# Patient Record
Sex: MALE | Race: WHITE | NOT HISPANIC OR LATINO | Employment: FULL TIME | URBAN - NONMETROPOLITAN AREA
[De-identification: names, ages, dates, MRNs, and addresses within clinical notes are randomized per-mention and may not be internally consistent; named-entity substitution may affect disease eponyms.]

---

## 2018-05-27 ENCOUNTER — OFFICE VISIT (OUTPATIENT)
Dept: URGENT CARE | Facility: PHYSICIAN GROUP | Age: 33
End: 2018-05-27
Payer: COMMERCIAL

## 2018-05-27 VITALS
TEMPERATURE: 98.6 F | HEART RATE: 82 BPM | RESPIRATION RATE: 16 BRPM | HEIGHT: 73 IN | OXYGEN SATURATION: 97 % | SYSTOLIC BLOOD PRESSURE: 118 MMHG | DIASTOLIC BLOOD PRESSURE: 72 MMHG | BODY MASS INDEX: 38.02 KG/M2 | WEIGHT: 286.9 LBS

## 2018-05-27 DIAGNOSIS — E66.9 OBESITY (BMI 30-39.9): ICD-10-CM

## 2018-05-27 DIAGNOSIS — L03.115 CELLULITIS OF RIGHT LEG: ICD-10-CM

## 2018-05-27 PROCEDURE — 99204 OFFICE O/P NEW MOD 45 MIN: CPT | Performed by: PHYSICIAN ASSISTANT

## 2018-05-27 RX ORDER — DOXYCYCLINE HYCLATE 100 MG
100 TABLET ORAL 2 TIMES DAILY
Qty: 20 TAB | Refills: 0 | Status: SHIPPED | OUTPATIENT
Start: 2018-05-27 | End: 2018-09-19

## 2018-05-27 ASSESSMENT — PAIN SCALES - GENERAL: PAINLEVEL: 5=MODERATE PAIN

## 2018-05-27 NOTE — LETTER
May 27, 2018         Patient: Zachary Massey   YOB: 1985   Date of Visit: 5/27/2018           To Whom it May Concern:    Zachary Massey was seen in my clinic on 5/27/2018. He may return to work on 5/30/18.    If you have any questions or concerns, please don't hesitate to call.        Sincerely,           Natali Hogan P.A.-C.  Electronically Signed

## 2018-05-27 NOTE — PROGRESS NOTES
Chief Complaint   Patient presents with   • Leg Pain     cellulitis right leg       HISTORY OF PRESENT ILLNESS: Patient is a 32 y.o. male who presents today for the following:    Patient comes in for evaluation of a fever that started 3 days ago. He complains of right lower extremity redness, swelling, and pain. He has a history of cellulitis in the right lower leg and states that this looks the same. He hasn't taken any over-the-counter medication. He drives a truck for a living and feels like he needs a couple of days of the swelling gets worse with sitting.    Patient Active Problem List    Diagnosis Date Noted   • Obesity (BMI 30-39.9) 05/27/2018       Allergies:Robitussin allergy-cough [[id number]] and Robitussin [guiatuss]    Current Outpatient Prescriptions Ordered in [facility]   Medication Sig Dispense Refill   • doxycycline (VIBRAMYCIN) 100 MG Tab Take 1 Tab by mouth 2 times a day. 20 Tab 0     No current Epic-ordered facility-administered medications on file.        Past Medical History:   Diagnosis Date   • Foot injury     fracture when 21 yrs       Social History   Substance Use Topics   • Smoking status: Current Every Day Smoker     Packs/day: 0.50     Types: Cigarettes     Start date: 11/8/2014   • Smokeless tobacco: Never Used      Comment: 1/2 pack a day.   • Alcohol use Yes      Comment: occ       No family status information on file.   No family history on file.    Review of Systems:    Constitutional ROS: No unexpected change in weight, No weakness, No fatigue  Eye ROS: No recent significant change in vision, No eye pain, redness, discharge  Ear ROS: No drainage, No tinnitus or vertigo, No recent change in hearing  Mouth/Throat ROS: No teeth or gum problems, No bleeding gums, No tongue complaints  Neck ROS: No swollen glands, No significant pain in neck  Pulmonary ROS: No chronic cough, sputum, or hemoptysis, No dyspnea on exertion, No wheezing  Cardiovascular ROS: No diaphoresis, No edema,  "No palpitations  Gastrointestinal ROS: No change in bowel habits, No significant change in appetite, No nausea, vomiting, diarrhea, or constipation  Hematologic/Lymphatic ROS: No chills, No night sweats, No weight loss  Skin/Integumentary ROS: No edema, No evidence of rash, No itching      Exam:  Blood pressure 118/72, pulse 82, temperature 37 °C (98.6 °F), resp. rate 16, height 1.854 m (6' 1\"), weight (!) 130.1 kg (286 lb 14.4 oz), SpO2 97 %.  General: Well developed, well nourished. No distress.  Eye: PERRL/EOMI; conjunctivae clear, lids normal.  ENMT: Head is grossly normal.  Pulmonary: Unlabored respiratory effort. Lungs clear to auscultation, no wheezes, no rhonchi.  Cardiovascular: Regular rate and rhythm without murmur. Right pedal pulse is strong.  Extremities: Right lower leg with diffuse edema and erythema on the posterior medial aspect. Tenderness is noted in this area.  Psych: Normal mood. Alert and oriented x3. Judgment and insight is normal.    Assessment/Plan:  Use all medication as directed. Discussed appropriate over-the-counter symptomatic medication, and when to return to clinic. Follow-up for worsening or persistent symptoms.  1. Cellulitis of right leg  doxycycline (VIBRAMYCIN) 100 MG Tab   2. Obesity (BMI 30-39.9)  Patient identified as having weight management issue.  Appropriate orders and counseling given.     "

## 2018-09-05 ENCOUNTER — APPOINTMENT (OUTPATIENT)
Dept: RADIOLOGY | Facility: IMAGING CENTER | Age: 33
End: 2018-09-05
Attending: PHYSICIAN ASSISTANT
Payer: COMMERCIAL

## 2018-09-05 ENCOUNTER — OFFICE VISIT (OUTPATIENT)
Dept: URGENT CARE | Facility: PHYSICIAN GROUP | Age: 33
End: 2018-09-05
Payer: COMMERCIAL

## 2018-09-05 VITALS
SYSTOLIC BLOOD PRESSURE: 122 MMHG | WEIGHT: 288 LBS | HEART RATE: 102 BPM | BODY MASS INDEX: 38 KG/M2 | DIASTOLIC BLOOD PRESSURE: 80 MMHG | TEMPERATURE: 99.4 F | RESPIRATION RATE: 16 BRPM | OXYGEN SATURATION: 97 %

## 2018-09-05 DIAGNOSIS — R07.81 RIB PAIN ON LEFT SIDE: ICD-10-CM

## 2018-09-05 DIAGNOSIS — L03.115 CELLULITIS OF RIGHT LOWER LEG: ICD-10-CM

## 2018-09-05 PROCEDURE — 71101 X-RAY EXAM UNILAT RIBS/CHEST: CPT | Mod: TC,FY,LT | Performed by: PHYSICIAN ASSISTANT

## 2018-09-05 PROCEDURE — 99214 OFFICE O/P EST MOD 30 MIN: CPT | Performed by: PHYSICIAN ASSISTANT

## 2018-09-05 RX ORDER — PREDNISONE 10 MG/1
TABLET ORAL
Qty: 1 QUANTITY SUFFICIENT | Refills: 0 | Status: SHIPPED | OUTPATIENT
Start: 2018-09-05 | End: 2018-09-19

## 2018-09-05 RX ORDER — DOXYCYCLINE HYCLATE 100 MG
100 TABLET ORAL 2 TIMES DAILY
Qty: 20 TAB | Refills: 0 | Status: SHIPPED | OUTPATIENT
Start: 2018-09-05 | End: 2018-09-19

## 2018-09-05 NOTE — LETTER
September 5, 2018         Patient: Zachary Massey   YOB: 1985   Date of Visit: 9/5/2018           To Whom it May Concern:    Zachary Massey was seen in my clinic on 9/5/2018. He may return to work on 9/10/18.    If you have any questions or concerns, please don't hesitate to call.        Sincerely,           Natali Hogan P.A.-C.  Electronically Signed

## 2018-09-06 NOTE — PROGRESS NOTES
Chief Complaint   Patient presents with   • Rib Pain     L Rib Cage x1mos   • Other     Pt stated Cellulitis on R leg x2yrs on and off       HISTORY OF PRESENT ILLNESS: Patient is a 33 y.o. male who presents today for the following:    Patient comes in for evaluation of 2 separate issues:    Right lower leg cellulitis  Patient has a history of significant injury in the right lower leg and has a history of recurring cellulitis.  He states it is starting again with erythema and some mild tenderness.  He denies fever, night sweats, chills, and overt pain at this time.    Left-sided rib pain  Patient reports pain on the anterior/inferior your left-sided ribs for the last several months.  It has been consistent for the last month and the pain itself has gotten worse.  He works as a  and does a lot of heavy lifting.  Any pressure in the area or heavy lifting makes it worse.  He denies any specific injuries.    Patient Active Problem List    Diagnosis Date Noted   • Obesity (BMI 30-39.9) 05/27/2018       Allergies:Robitussin allergy-cough [[id number]-[id number]-fl] and Robitussin [guiatuss]    Current Outpatient Prescriptions Ordered in [facility]   Medication Sig Dispense Refill   • doxycycline (VIBRAMYCIN) 100 MG Tab Take 1 Tab by mouth 2 times a day. 20 Tab 0   • predniSONE (DELTASONE) 10 MG Tab 50 mg x 1 day; 40 mg x 1 day; 30 mg x 1 day; 20 mg x 1 day; 10 mg x 1 day 1 Quantity Sufficient 0   • doxycycline (VIBRAMYCIN) 100 MG Tab Take 1 Tab by mouth 2 times a day. 20 Tab 0     No current Epic-ordered facility-administered medications on file.        Past Medical History:   Diagnosis Date   • Foot injury     fracture when 21 yrs       Social History   Substance Use Topics   • Smoking status: Current Every Day Smoker     Packs/day: 0.50     Types: Cigarettes     Start date: 11/8/2014   • Smokeless tobacco: Never Used      Comment: 1/2 pack a day.   • Alcohol use Yes      Comment: occ       No family status information  on file.   No family history on file.    Review of Systems:   Constitutional ROS: No unexpected change in weight, No weakness, No fatigue  Eye ROS: No recent significant change in vision, No eye pain, redness, discharge  Ear ROS: No drainage, No tinnitus or vertigo, No recent change in hearing  Mouth/Throat ROS: No teeth or gum problems, No bleeding gums, No tongue complaints  Neck ROS: No swollen glands, No significant pain in neck  Pulmonary ROS: No chronic cough, sputum, or hemoptysis, No dyspnea on exertion, No wheezing  Cardiovascular ROS: No diaphoresis, No edema, No palpitations  Gastrointestinal ROS: No change in bowel habits, No significant change in appetite, No nausea, vomiting, diarrhea, or constipation  Hematologic/Lymphatic ROS: No chills, No night sweats, No weight loss      Exam:  Blood pressure 122/80, pulse (!) 102, temperature 37.4 °C (99.4 °F), resp. rate 16, weight (!) 130.6 kg (288 lb), SpO2 97 %.  General: Well developed, well nourished. No distress.  Pulmonary: Unlabored respiratory effort. Lungs clear to auscultation, no wheezes, no rhonchi.  Cardiovascular: Regular rate and rhythm without murmur.  Ribs: Tenderness is noted on the anterior/inferior left side without associated erythema, ecchymosis, soft tissue swelling, or skin changes.  No specific palpable lesions noted.  Extremities: Mild erythema is noted on the inferior medial aspect of the right lower leg.  Blanches with pressure.  No induration noted.  Neurologic: Grossly nonfocal. No facial asymmetry noted.  Skin: Warm, dry, good turgor. No rashes in visible areas.   Psych: Normal mood. Alert and oriented x3. Judgment and insight is normal.    Left-sided ribs, per radiology:  Impression       Normal rib series.     Assessment/Plan:  Patient is frequently on the road and without immediate access to healthcare.  Will provide doxycycline for any worsening signs of cellulitis in the right lower extremity.  Use all medication as directed.   Follow-up for worsening or persistent symptoms.  1. Cellulitis of right lower leg  doxycycline (VIBRAMYCIN) 100 MG Tab   2. Rib pain on left side  predniSONE (DELTASONE) 10 MG Tab    [id number] (WITH 1-VIEW CXR) LEFT

## 2018-09-17 ENCOUNTER — TELEPHONE (OUTPATIENT)
Dept: URGENT CARE | Facility: PHYSICIAN GROUP | Age: 33
End: 2018-09-17

## 2018-09-17 DIAGNOSIS — R10.12 LUQ PAIN: ICD-10-CM

## 2018-09-17 NOTE — TELEPHONE ENCOUNTER
Pain in LUQ persists - requesting additional imaging. Too painful to drive.    Will order CT for further evaluation for patient to schedule at his convenience.  Discussed ER precautions for worsening pain.  Patient verbalizes understanding and agrees with plan of care.

## 2018-09-19 ENCOUNTER — OFFICE VISIT (OUTPATIENT)
Dept: MEDICAL GROUP | Facility: PHYSICIAN GROUP | Age: 33
End: 2018-09-19
Payer: COMMERCIAL

## 2018-09-19 ENCOUNTER — HOSPITAL ENCOUNTER (OUTPATIENT)
Dept: RADIOLOGY | Facility: MEDICAL CENTER | Age: 33
End: 2018-09-19
Attending: PHYSICIAN ASSISTANT
Payer: COMMERCIAL

## 2018-09-19 VITALS
HEART RATE: 104 BPM | OXYGEN SATURATION: 97 % | DIASTOLIC BLOOD PRESSURE: 78 MMHG | SYSTOLIC BLOOD PRESSURE: 126 MMHG | BODY MASS INDEX: 39.02 KG/M2 | WEIGHT: 294.4 LBS | RESPIRATION RATE: 20 BRPM | TEMPERATURE: 98.6 F | HEIGHT: 73 IN

## 2018-09-19 DIAGNOSIS — Z13.29 SCREENING FOR THYROID DISORDER: ICD-10-CM

## 2018-09-19 DIAGNOSIS — R07.89 LEFT-SIDED CHEST WALL PAIN: ICD-10-CM

## 2018-09-19 DIAGNOSIS — R10.12 LUQ PAIN: ICD-10-CM

## 2018-09-19 DIAGNOSIS — Z13.21 ENCOUNTER FOR VITAMIN DEFICIENCY SCREENING: ICD-10-CM

## 2018-09-19 DIAGNOSIS — R10.9 ABDOMINAL PAIN, UNSPECIFIED ABDOMINAL LOCATION: ICD-10-CM

## 2018-09-19 DIAGNOSIS — Z13.6 SCREENING FOR CARDIOVASCULAR CONDITION: ICD-10-CM

## 2018-09-19 PROCEDURE — 99214 OFFICE O/P EST MOD 30 MIN: CPT | Performed by: NURSE PRACTITIONER

## 2018-09-19 PROCEDURE — 700117 HCHG RX CONTRAST REV CODE 255: Performed by: PHYSICIAN ASSISTANT

## 2018-09-19 PROCEDURE — 74160 CT ABDOMEN W/CONTRAST: CPT

## 2018-09-19 RX ORDER — DICLOFENAC SODIUM 75 MG/1
75 TABLET, DELAYED RELEASE ORAL 2 TIMES DAILY PRN
Qty: 30 TAB | Refills: 0 | Status: SHIPPED | OUTPATIENT
Start: 2018-09-19 | End: 2018-10-31

## 2018-09-19 RX ORDER — CYCLOBENZAPRINE HCL 10 MG
10 TABLET ORAL 3 TIMES DAILY PRN
Qty: 30 TAB | Refills: 0 | Status: SHIPPED | OUTPATIENT
Start: 2018-09-19 | End: 2018-10-31

## 2018-09-19 RX ADMIN — IOHEXOL 50 ML: 240 INJECTION, SOLUTION INTRATHECAL; INTRAVASCULAR; INTRAVENOUS; ORAL at 11:51

## 2018-09-19 RX ADMIN — IOHEXOL 100 ML: 350 INJECTION, SOLUTION INTRAVENOUS at 11:51

## 2018-09-19 ASSESSMENT — PATIENT HEALTH QUESTIONNAIRE - PHQ9: CLINICAL INTERPRETATION OF PHQ2 SCORE: 0

## 2018-09-19 ASSESSMENT — PAIN SCALES - GENERAL: PAINLEVEL: NO PAIN

## 2018-09-19 NOTE — LETTER
September 19, 2018    To Whom It May Concern:         This is confirmation that Zachary Massey attended his scheduled appointment with GEORGIA Canales on 9/19/18.    Please excuse Zachary Massey from work for this week 9/17 to 9/21/18.  He is in the process of filing for extended leave of absence and will get paperwork to me this week.         If you have any questions please do not hesitate to call me at the phone number listed below.    Sincerely,          Concepcion Banuelos A.P.R.N.  [phone]

## 2018-09-20 ENCOUNTER — OFFICE VISIT (OUTPATIENT)
Dept: MEDICAL GROUP | Facility: PHYSICIAN GROUP | Age: 33
End: 2018-09-20
Payer: COMMERCIAL

## 2018-09-20 VITALS
OXYGEN SATURATION: 97 % | WEIGHT: 294.4 LBS | HEIGHT: 73 IN | HEART RATE: 99 BPM | SYSTOLIC BLOOD PRESSURE: 134 MMHG | DIASTOLIC BLOOD PRESSURE: 84 MMHG | RESPIRATION RATE: 20 BRPM | BODY MASS INDEX: 39.02 KG/M2 | TEMPERATURE: 98.3 F

## 2018-09-20 DIAGNOSIS — R07.89 LEFT-SIDED CHEST WALL PAIN: ICD-10-CM

## 2018-09-20 PROCEDURE — 99213 OFFICE O/P EST LOW 20 MIN: CPT | Performed by: NURSE PRACTITIONER

## 2018-09-20 ASSESSMENT — PAIN SCALES - GENERAL: PAINLEVEL: 2=MINIMAL-SLIGHT

## 2018-09-20 NOTE — PROGRESS NOTES
CC: Left-sided chest wall pain and to establish care    HISTORY OF THE PRESENT ILLNESS: Patient is a 33 y.o. male. This pleasant patient is here today, accompanied by wife and 2 children, to establish care and for left-sided chest wall pain.  Patient drives a truck for a living.  He does not exercise daily.  He was wanting to get back into bike riding, but had to delay that secondary to chest wall pain.      Left-sided chest wall pain  Patient reports left sided low anterior chest wall pain.  He reports it started about a month ago, getting progressively worse.  Begins to hurt after sitting up for a little over an hour, which affects his ability to work as a .  Describes quality of pain as burning and constant, with a numb quality.  Reports pain gets so severe that he is unable to take a deep breath.  Pain is relieved with reclining backwards or lying flat.  Patient was seen in urgent care 2 weeks ago, chest x-ray and abdominal CT ordered, both results normal.  Patient was prescribed cyclobenzaprine and diclofenac today by urgent care provider.  Patient has not started this medication yet.  Patient will start medication tonight.  Will refer urgently to physiatry.  Consider gabapentin.  Patient has missed work this week.  He was instructed to have extended leave paperwork filled out.  Patient did not know he was supposed to bring paperwork in with him.  He will return tomorrow with [facility] paperwork and we will fill it out together at appointment.  A letter was provided to patient today to excuse him from work this week.      Allergies: Robitussin allergy-cough [pobsdyqvr-yknqmpkv-zd] and Robitussin [guiatuss]    Current Outpatient Prescriptions Ordered in [facility]   Medication Sig Dispense Refill   • diclofenac EC (VOLTAREN) 75 MG Tablet Delayed Response Take 1 Tab by mouth 2 times a day as needed (pain). 30 Tab 0   • cyclobenzaprine (FLEXERIL) 10 MG Tab Take 1 Tab by mouth 3 times a day as needed for Mild  "Pain or Moderate Pain. Do not take while driving or working. 30 Tab 0     No current Epic-ordered facility-administered medications on file.        Past Medical History:   Diagnosis Date   • Foot injury     fracture when 21 yrs       Past Surgical History:   Procedure Laterality Date   • OTHER  2003    donor for bone marrow transplant   • PB REMOVAL OF NAIL BED  right great toe.        Social History   Substance Use Topics   • Smoking status: Former Smoker     Packs/day: 0.50     Types: Cigarettes     Quit date: 8/1/2018   • Smokeless tobacco: Never Used      Comment: 1/2 pack a day.   • Alcohol use Yes      Comment: occ       Family History   Problem Relation Age of Onset   • Other Brother         leukemia       ROS:     - Constitutional: Negative for fever, chills, unexpected weight change, and fatigue/generalized weakness.     - HEENT: Negative for headaches, vision changes, hearing changes, ear pain, rhinorrhea, sinus congestion, and sore throat.      - Respiratory: Negative for cough, dyspnea, and wheezing.      - Cardiovascular: Negative for chest pain, palpitations,  and bilateral lower extremity edema.     - Gastrointestinal: Negative for nausea, vomiting, abdominal pain, melena, diarrhea, constipation.     - Genitourinary: Negative for dysuria, polyuria, hematuria, pyuria, urinary urgency, and urinary incontinence.    - Musculoskeletal: As in HPI.     - Skin: Negative for rash.  Positive for frequent cellulitis in right lower leg.    - Neurological: Negative for dizziness, tremors, focal sensory deficit, focal weakness and headaches.     - Endo/Heme/Allergies: Does not bruise/bleed easily.     - Psychiatric/Behavioral: Negative for depression.             Exam: Blood pressure 126/78, pulse (!) 104, temperature 37 °C (98.6 °F), temperature source Temporal, resp. rate 20, height 1.854 m (6' 1\"), weight (!) 133.5 kg (294 lb 6.4 oz), SpO2 97 %. Body mass index is 38.84 kg/m².    General: Pleasant, alert, obese " habitus, well nourished, well developed male in NAD  HEENT: Normocephalic. Eyes conjunctiva clear lids without ptosis, pupils equal and reactive to light, ears normal shape and contour, canals are clear bilaterally, tympanic membranes are pearly gray with good light reflex, nasal mucosa without erythema and drainage, oropharynx is without erythema, edema or exudates.   Neck: Supple without bruit. Thyroid is not enlarged.  Pulmonary: Clear to ausculation.  Normal effort. No rales, ronchi, or wheezing.  Cardiovascular: Normal rate and rhythm without murmur. Carotid and radial pulses are intact and equal bilaterally.  No lower extremity edema.  Chest: No erythema or edema.  Extremely tender to palpation at left low anterior thorax.  Abdomen: Soft, nondistended.  Moderately tender to palpation of left upper quadrant and epigastric area.    Normal bowel sounds. Liver and spleen are not palpable  Neurologic: Grossly nonfocal  Lymph: No cervical or supraclavicular lymph nodes are palpable  Skin: Warm and dry.  No obvious lesions.  Musculoskeletal: Normal gait.   Psych: Normal mood and affect. Alert and oriented. Judgment and insight is normal.    Please note that this dictation was created using voice recognition software. I have made every reasonable attempt to correct obvious errors, but I expect that there are errors of grammar and possibly content that I did not discover before finalizing the note.      Assessment/Plan  1. Screening for cardiovascular condition    - COMP METABOLIC PANEL; Future  - LIPID PROFILE; Future    2. Screening for thyroid disorder    - TSH; Future  - FREE THYROXINE; Future    3. Encounter for vitamin deficiency screening    - VITAMIN D,25 HYDROXY; Future    4. Left-sided chest wall pain  Patient will start taking medication prescribed to him today from urgent care provider, cyclobenzaprine and Voltaren.  Negative chest x-ray and abdominal CT.  May be nerve pain.  Will refer to physiatry for  further evaluation and management.  - REFERRAL TO PHYSIATRY (PMR)    Patient will return to clinic tomorrow to have FMLA paperwork filled out.  Patient will have lab work done in the next few weeks and return to review results.  Patient will follow up in clinic sooner if needed.

## 2018-09-20 NOTE — ASSESSMENT & PLAN NOTE
Patient reports left sided low anterior chest wall pain.  He reports it started about a month ago, getting progressively worse.  Begins to hurt after sitting up for a little over an hour, which affects his ability to work as a .  Describes quality of pain as burning and constant, with a numb quality.  Reports pain gets so severe that he is unable to take a deep breath.  Pain is relieved with reclining backwards or lying flat.  Patient was seen in urgent care 2 weeks ago, chest x-ray and abdominal CT ordered, both results normal.  Patient was prescribed cyclobenzaprine and diclofenac today by urgent care provider.  Patient has not started this medication yet.  Patient will start medication tonight.  Will refer urgently to physiatry.  Consider gabapentin.  Patient has missed work this week.  He was instructed to have extended leave paperwork filled out.  Patient did not know he was supposed to bring paperwork in with him.  He will return tomorrow with [facility] paperwork and we will fill it out together at appointment.  A letter was provided to patient today to excuse him from work this week.

## 2018-09-21 NOTE — ASSESSMENT & PLAN NOTE
Patient presents today for FMLA paperwork completion.  Please see scanned paperwork.  Patient was fully assessed at yesterday's appointment.

## 2018-09-21 NOTE — PROGRESS NOTES
"  Chief complaint: Left side chest wall pain    Left-sided chest wall pain  Patient presents today for [facility] paperwork completion.  Please see scanned paperwork.  Patient was fully assessed at yesterday's appointment.        Blood pressure 134/84, pulse 99, temperature 36.8 °C (98.3 °F), temperature source Temporal, resp. rate 20, height 1.854 m (6' 1\"), weight (!) 133.5 kg (294 lb 6.4 oz), SpO2 97 %.      Patient was seen for 15 minutes face to face of which > 50% of appointment time was spent reviewing work limits and further workup of etiology and treatment of left chest wall pain, and on counseling and coordination of care regarding the above.  "

## 2018-09-26 ENCOUNTER — TELEPHONE (OUTPATIENT)
Dept: MEDICAL GROUP | Facility: PHYSICIAN GROUP | Age: 33
End: 2018-09-26

## 2018-09-26 DIAGNOSIS — R07.89 CHEST WALL PAIN: ICD-10-CM

## 2018-09-26 NOTE — TELEPHONE ENCOUNTER
Please let patient know that the specialist I referred him to does not see patients with his diagnosis.   Please  inquire if his chest wall pain is improving.    Thanks

## 2018-09-27 RX ORDER — LIDOCAINE 50 MG/G
1 PATCH TOPICAL EVERY 24 HOURS
Qty: 30 PATCH | Refills: 1 | Status: SHIPPED | OUTPATIENT
Start: 2018-09-27 | End: 2018-10-31

## 2018-09-27 NOTE — TELEPHONE ENCOUNTER
Please let patient know that I have sent a prescription to pharmacy for lidocaine patch.  This is a pain relieving patch.  He applies it for 12 hours during the day, then removes for 12 hours before applying new patch.   I would like to see him in clinic next week for reevaluation.  Will you schedule him to see me next week?

## 2018-09-27 NOTE — TELEPHONE ENCOUNTER
Called [phone] ([place])  [facility] for patient to call [phone] to go over provider notes.

## 2018-09-29 NOTE — TELEPHONE ENCOUNTER
Called [phone] ([place])  [facility] for patient to call [phone] to go over provider notes.

## 2018-10-04 ENCOUNTER — HOSPITAL ENCOUNTER (OUTPATIENT)
Dept: LAB | Facility: MEDICAL CENTER | Age: 33
End: 2018-10-04
Attending: NURSE PRACTITIONER
Payer: COMMERCIAL

## 2018-10-04 DIAGNOSIS — Z13.21 ENCOUNTER FOR VITAMIN DEFICIENCY SCREENING: ICD-10-CM

## 2018-10-04 DIAGNOSIS — Z13.6 SCREENING FOR CARDIOVASCULAR CONDITION: ICD-10-CM

## 2018-10-04 DIAGNOSIS — Z13.29 SCREENING FOR THYROID DISORDER: ICD-10-CM

## 2018-10-04 LAB
ALBUMIN SERPL BCP-MCNC: 4 G/DL (ref 3.2–4.9)
ALBUMIN/GLOB SERPL: 1.2 G/DL
ALP SERPL-CCNC: 123 U/L (ref 30–99)
ALT SERPL-CCNC: 77 U/L (ref 2–50)
ANION GAP SERPL CALC-SCNC: 8 MMOL/L (ref 0–11.9)
AST SERPL-CCNC: 33 U/L (ref 12–45)
BILIRUB SERPL-MCNC: 0.7 MG/DL (ref 0.1–1.5)
BUN SERPL-MCNC: 16 MG/DL (ref 8–22)
CALCIUM SERPL-MCNC: 9.3 MG/DL (ref 8.5–10.5)
CHLORIDE SERPL-SCNC: 105 MMOL/L (ref 96–112)
CHOLEST SERPL-MCNC: 150 MG/DL (ref 100–199)
CO2 SERPL-SCNC: 25 MMOL/L (ref 20–33)
CREAT SERPL-MCNC: 0.98 MG/DL (ref 0.5–1.4)
FASTING STATUS PATIENT QL REPORTED: NORMAL
GLOBULIN SER CALC-MCNC: 3.3 G/DL (ref 1.9–3.5)
GLUCOSE SERPL-MCNC: 126 MG/DL (ref 65–99)
HDLC SERPL-MCNC: 37 MG/DL
LDLC SERPL CALC-MCNC: 96 MG/DL
POTASSIUM SERPL-SCNC: 4.1 MMOL/L (ref 3.6–5.5)
PROT SERPL-MCNC: 7.3 G/DL (ref 6–8.2)
SODIUM SERPL-SCNC: 138 MMOL/L (ref 135–145)
TRIGL SERPL-MCNC: 84 MG/DL (ref 0–149)

## 2018-10-04 PROCEDURE — 82306 VITAMIN D 25 HYDROXY: CPT

## 2018-10-04 PROCEDURE — 36415 COLL VENOUS BLD VENIPUNCTURE: CPT

## 2018-10-04 PROCEDURE — 80053 COMPREHEN METABOLIC PANEL: CPT

## 2018-10-04 PROCEDURE — 84439 ASSAY OF FREE THYROXINE: CPT

## 2018-10-04 PROCEDURE — 80061 LIPID PANEL: CPT

## 2018-10-04 PROCEDURE — 84443 ASSAY THYROID STIM HORMONE: CPT

## 2018-10-05 LAB
25(OH)D3 SERPL-MCNC: 17 NG/ML (ref 30–100)
T4 FREE SERPL-MCNC: 0.8 NG/DL (ref 0.53–1.43)
TSH SERPL DL<=0.005 MIU/L-ACNC: 2.41 UIU/ML (ref 0.38–5.33)

## 2018-10-16 ENCOUNTER — TELEPHONE (OUTPATIENT)
Dept: MEDICAL GROUP | Facility: PHYSICIAN GROUP | Age: 33
End: 2018-10-16

## 2018-10-16 NOTE — TELEPHONE ENCOUNTER
Phone Number Called: [phone] (home)     Message: Patient left a message with the front staff stating that he had questions regarding a referral. Called [phone] patients voice mail is not set up at this time-unable to leave a message.    Left Message for patient to call back: no

## 2018-10-18 ENCOUNTER — TELEPHONE (OUTPATIENT)
Dept: MEDICAL GROUP | Facility: PHYSICIAN GROUP | Age: 33
End: 2018-10-18

## 2018-10-18 NOTE — TELEPHONE ENCOUNTER
Patients wife called she wants a referral to see Cardiology, her  is having chest pain an shortness of breath unable to work for 2 weeks.  I advised her to go to the ER if he is having any chest pain.  She said she will call an make him an appointment to see a cardiologist.  I again advised her to go to the ER for them to do a cardiac work up on him.  she said she will talk to him an try to make an appointment.

## 2018-10-18 NOTE — TELEPHONE ENCOUNTER
----- Message from Monica Marsh sent at 10/15/2018  9:23 AM PDT -----  Contact: [phone]  Pt came in for referral and there is not anything in chart for a new referral please give pt a call back asap thank you

## 2018-10-22 ENCOUNTER — TELEPHONE (OUTPATIENT)
Dept: MEDICAL GROUP | Facility: PHYSICIAN GROUP | Age: 33
End: 2018-10-22

## 2018-10-22 DIAGNOSIS — R07.81 RIB PAIN ON LEFT SIDE: ICD-10-CM

## 2018-10-22 DIAGNOSIS — M94.0 SLIPPING RIB SYNDROME: ICD-10-CM

## 2018-10-22 NOTE — TELEPHONE ENCOUNTER
"Telephone call with patient.  He reports that his left low chest wall/left upper quadrant pain is not any better.  He has not tried lidocaine patch that was prescribed two weeks ago. Patient will  prescription today.  Patient was referred to physiatry for chest wall pain, but they would not evaluate patient for diagnosis of chest wall pain.  Pain may be lower rib syndrome/slipping rib syndrome.  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  "

## 2018-10-29 ENCOUNTER — TELEPHONE (OUTPATIENT)
Dept: MEDICAL GROUP | Facility: PHYSICIAN GROUP | Age: 33
End: 2018-10-29

## 2018-10-29 NOTE — TELEPHONE ENCOUNTER
Please call patient and let him know that he is to call to make an appointment with physiatrist/pain specialist. Thanks    [facility]   Dr. Sahil Malave   [address] [facility] [id number]   [place] [address]   Phone: [phone]

## 2018-10-29 NOTE — TELEPHONE ENCOUNTER
Phone Number Called: [phone] (home)       Message: prompt states: VM not set up at this time-unable to leave a voice message.    Left Message for patient to call back: no

## 2018-10-31 ENCOUNTER — OFFICE VISIT (OUTPATIENT)
Dept: MEDICAL GROUP | Facility: PHYSICIAN GROUP | Age: 33
End: 2018-10-31
Payer: COMMERCIAL

## 2018-10-31 VITALS
OXYGEN SATURATION: 96 % | HEIGHT: 73 IN | DIASTOLIC BLOOD PRESSURE: 80 MMHG | BODY MASS INDEX: 39.36 KG/M2 | TEMPERATURE: 97.5 F | SYSTOLIC BLOOD PRESSURE: 128 MMHG | WEIGHT: 297 LBS | RESPIRATION RATE: 16 BRPM | HEART RATE: 82 BPM

## 2018-10-31 DIAGNOSIS — R73.9 BLOOD GLUCOSE ELEVATED: ICD-10-CM

## 2018-10-31 DIAGNOSIS — R07.89 LEFT-SIDED CHEST WALL PAIN: ICD-10-CM

## 2018-10-31 DIAGNOSIS — E55.9 VITAMIN D DEFICIENCY: ICD-10-CM

## 2018-10-31 PROCEDURE — 99214 OFFICE O/P EST MOD 30 MIN: CPT | Performed by: NURSE PRACTITIONER

## 2018-10-31 RX ORDER — ERGOCALCIFEROL 1.25 MG/1
50000 CAPSULE ORAL
Qty: 12 CAP | Refills: 0 | Status: SHIPPED | OUTPATIENT
Start: 2018-10-31 | End: 2022-02-15

## 2018-10-31 RX ORDER — GABAPENTIN 100 MG/1
CAPSULE ORAL
Qty: 90 CAP | Refills: 1 | Status: SHIPPED | OUTPATIENT
Start: 2018-10-31 | End: 2019-01-09

## 2018-10-31 NOTE — ASSESSMENT & PLAN NOTE
New onset.  Fasting blood glucose is elevated at 126.  Patient denies vision changes, numbness, tingling, frequent urination.  Discussed that he may have diabetes.  Will get second fasting glucose in addition to A1c.  Discussed diabetic low carbohydrate diet.

## 2018-10-31 NOTE — ASSESSMENT & PLAN NOTE
Patient continues with left side chest wall pain, worsening.  Was only occurring when would sit too long.  Now occurring with movement and ambulation.  Quality of pain is burning.  Sometimes shoots from left low anterior rib cage to left shoulder.  Patient reports at times pain causes nausea and hard to catch breath as deep breathing aggravates it.  Will have days without pain.  Has not tried lidocaine patch, but did trial lidocaine gel.  Reports lidocaine did not help.  He has also tried diclofenac and cyclobenzaprine without relief.  I did refer patient to Physiatry for possible slipping rib syndrome.  Patient heard back about scheduling received a message to schedule, but he has not done so yet.  I have given him the phone number to call, and he will set up appointment today.  In the meantime, I will prescribe gabapentin.  Instructions and side effects reviewed with patient.  Patient understands to slowly titrate medication and to start at bedtime.  He is aware that this may make him sleepy.  Patient is worried that this may be cardiac related.  I am fairly certain that this is muscular or nerve related.  Patient is currently having chest wall pain.  Will get EKG today.  Patient is also concerned as he has been missing work because of pain.  He is asking for a note to return to work on light duty.  I have given him a letter today to return to work for light duty on 11/5/18.

## 2018-10-31 NOTE — LETTER
October 31, 2018      Regarding:  Zachary Massey          To Whom it May Concern:    Zachary Massey is able to return to work on 11/5/18 on light duty.  We are still in the process of working up the etiology and treatment for his condition.    If you have any questions or concerns, please don't hesitate to call.        Sincerely,        SARAH Canales.    Electronically Signed

## 2018-10-31 NOTE — PROGRESS NOTES
CC:  Chest wall pain and lab review    HISTORY OF THE PRESENT ILLNESS: Patient is a 33 y.o. male. This pleasant patient is here today, accompanied by wife, for the following health problems and to review lab results.    Health Maintenance: Patient declined flu vaccine, though we reviewed benefits of flu vaccine.  Encouraged him to wash his hands frequently and stay out of environments with people who are ill.        Vitamin D deficiency  New finding.  Serum Vitamin D is 17.  Explained that Vitamin D is important in the absorption of calcium, which is instrumental in bone strength. Will prescribe Vitamin D 50,000 units once a week for 12 weeks.  Once you are done with weekly vitamin D, patient will start over the counter supplemental Vitamin D3 4000 units daily.        Blood glucose elevated  New onset.  Fasting blood glucose is elevated at 126.  Patient denies vision changes, numbness, tingling, frequent urination.  Discussed that he may have diabetes.  Will get second fasting glucose in addition to A1c.  Discussed diabetic low carbohydrate diet.     Left-sided chest wall pain  Patient continues with left side chest wall pain, worsening.  Was only occurring when would sit too long.  Now occurring with movement and ambulation.  Quality of pain is burning.  Sometimes shoots from left low anterior rib cage to left shoulder.  Patient reports at times pain causes nausea and hard to catch breath as deep breathing aggravates it.  Will have days without pain.  Has not tried lidocaine patch, but did trial lidocaine gel.  Reports lidocaine did not help.  He has also tried diclofenac and cyclobenzaprine without relief.  I did refer patient to Physiatry for possible slipping rib syndrome.  Patient heard back about scheduling received a message to schedule, but he has not done so yet.  I have given him the phone number to call, and he will set up appointment today.  In the meantime, I will prescribe gabapentin.  Instructions and  side effects reviewed with patient.  Patient understands to slowly titrate medication and to start at bedtime.  He is aware that this may make him sleepy.  Patient is worried that this may be cardiac related.  I am fairly certain that this is muscular or nerve related.  Patient is currently having chest wall pain.  Will get EKG today.  Patient is also concerned as he has been missing work because of pain.  He is asking for a note to return to work on light duty.  I have given him a letter today to return to work for light duty on 11/5/18.      Allergies: Robitussin allergy-cough [[id number]] and Robitussin [guiatuss]    Current Outpatient Prescriptions Ordered in [facility]   Medication Sig Dispense Refill   • gabapentin (NEURONTIN) 100 MG Cap Take 1 tab at bedtime for one week, then take 1 tab twice a day for a week, then 1 tab 3 times a day thereafter 90 Cap 1   • vitamin D, Ergocalciferol, (DRISDOL) 69051 units Cap capsule Take 1 Cap by mouth every 7 days. 12 Cap 0     No current Epic-ordered facility-administered medications on file.        Past Medical History:   Diagnosis Date   • Foot injury     fracture when 21 yrs       Past Surgical History:   Procedure Laterality Date   • OTHER  2003    donor for bone marrow transplant   • PB REMOVAL OF NAIL BED  right great toe.        Social History   Substance Use Topics   • Smoking status: Former Smoker     Packs/day: 0.50     Types: Cigarettes     Quit date: 8/1/2018   • Smokeless tobacco: Never Used      Comment: 1/2 pack a day.   • Alcohol use Yes      Comment: occ       Family History   Problem Relation Age of Onset   • Diabetes Mother    • Heart Attack Father    • Hypertension Father    • Other Brother         leukemia   • Diabetes Paternal Aunt        ROS:     - Constitutional: Negative for fever, chills, unexpected weight change.     - HEENT: Negative for headaches, vision changes, hearing changes, ear pain, rhinorrhea, sinus congestion, and sore throat.   "    - Respiratory: Negative for cough, dyspnea, and wheezing.      - Cardiovascular: As in HPI.     - Gastrointestinal: Negative for heartburn, vomiting, abdominal pain, melena, diarrhea, constipation.     - Genitourinary: Negative for dysuria.    - Musculoskeletal: As in HPI, otherwise negative for joint pain.     - Skin: Negative for rash, itching.     - Neurological: Negative for dizziness, tingling, tremors.        Exam: Blood pressure 128/80, pulse 82, temperature 36.4 °C (97.5 °F), temperature source Temporal, resp. rate 16, height 1.854 m (6' 1\"), weight (!) 134.7 kg (297 lb), SpO2 96 %. Body mass index is 39.18 kg/m².    General: Alert, pleasant, obese habitus, well nourished, well developed male in NAD  Pulmonary: Clear to ausculation.  Normal effort. No rales, ronchi, or wheezing.  Cardiovascular: Normal rate and rhythm without murmur. Carotid and radial pulses are intact and equal bilaterally.  No lower extremity edema.  Abdomen: Soft, nontender, nondistended. Normal bowel sounds.   Neurologic: Grossly nonfocal  Thorax:  Pain reproducible with palpation to left anterior low ribcage.  Skin: Warm and dry.  No obvious lesions.  Musculoskeletal: Normal gait.   Psych: Normal mood and affect. Alert and oriented. Judgment and insight is normal.    EKG:  Reviewed with Dr. Knight.  Shows NSR, questionable RBBB.      Please note that this dictation was created using voice recognition software. I have made every reasonable attempt to correct obvious errors, but I expect that there are errors of grammar and possibly content that I did not discover before finalizing the note.      Assessment/Plan  1. Blood glucose elevated    - COMP METABOLIC PANEL; Future  - HEMOGLOBIN A1C; Future    2. Vitamin D deficiency    - vitamin D, Ergocalciferol, (DRISDOL) 64529 units Cap capsule; Take 1 Cap by mouth every 7 days.  Dispense: 12 Cap; Refill: 0    3. Left-sided chest wall pain  - gabapentin (NEURONTIN) 100 MG Cap; Take 1 tab " at bedtime for one week, then take 1 tab twice a day for a week, then 1 tab 3 times a day thereafter  Dispense: 90 Cap; Refill: 1  - EKG - Clinic Performed    EKG show possible RBBB vs LBBB  Will message cardiology to review EKG.  I will notify patient of recommendations.  Patient and wife verbalized understanding.    Patient will return to clinic in 4 weeks to review lab work and for elevated blood glucose.

## 2018-10-31 NOTE — ASSESSMENT & PLAN NOTE
New finding.  Serum Vitamin D is 17.  Explained that Vitamin D is important in the absorption of calcium, which is instrumental in bone strength. Will prescribe Vitamin D 50,000 units once a week for 12 weeks.  Once you are done with weekly vitamin D, patient will start over the counter supplemental Vitamin D3 4000 units daily.

## 2018-10-31 NOTE — PATIENT INSTRUCTIONS
Call for appointment:  [facility]  Dr. Sahil Malave  [address]  [place] [address]  Phone: [phone]     Take prescription vitamin D once a week.  When done with weekly vitamin D, take over the counter vitamin D3 4000 units daily.    Get fasting lab work done in 1 month before appointment with me.

## 2018-11-01 ENCOUNTER — TELEPHONE (OUTPATIENT)
Dept: MEDICAL GROUP | Facility: PHYSICIAN GROUP | Age: 33
End: 2018-11-01

## 2018-11-01 DIAGNOSIS — R94.31 ABNORMAL EKG: ICD-10-CM

## 2018-11-01 NOTE — TELEPHONE ENCOUNTER
EKG reviewed by cardiology.  Shows SR w/ RSR' of V1 & V2, PAC.  Will refer to cardiology for evaluation.  Patient notified by me via phone call.  Also reminded patient to call and make appointment with Dr. Malave or Dr. Monroy for evaluation of chest wall pain.  He was supposed to set up appointment yesterday, but failed to do so. Yesterday at appointment, he was distressed about not being able to work due to the pain. I explained that it is in his hands to get appointments set up. Patient verbalized understanding and said wife will set up appointment.

## 2018-11-08 ENCOUNTER — OFFICE VISIT (OUTPATIENT)
Dept: PHYSICAL MEDICINE AND REHAB | Facility: MEDICAL CENTER | Age: 33
End: 2018-11-08
Payer: COMMERCIAL

## 2018-11-08 VITALS
WEIGHT: 298.06 LBS | TEMPERATURE: 97.3 F | OXYGEN SATURATION: 96 % | HEIGHT: 73 IN | SYSTOLIC BLOOD PRESSURE: 122 MMHG | DIASTOLIC BLOOD PRESSURE: 82 MMHG | BODY MASS INDEX: 39.5 KG/M2 | HEART RATE: 91 BPM

## 2018-11-08 DIAGNOSIS — R07.89 CHEST WALL PAIN: ICD-10-CM

## 2018-11-08 DIAGNOSIS — M79.604 RIGHT LEG PAIN: ICD-10-CM

## 2018-11-08 DIAGNOSIS — M79.671 RIGHT FOOT PAIN: ICD-10-CM

## 2018-11-08 DIAGNOSIS — R10.9 ABDOMINAL PAIN, UNSPECIFIED ABDOMINAL LOCATION: ICD-10-CM

## 2018-11-08 DIAGNOSIS — M79.18 MYOFASCIAL PAIN: ICD-10-CM

## 2018-11-08 DIAGNOSIS — M79.89 LEG SWELLING: ICD-10-CM

## 2018-11-08 DIAGNOSIS — M79.2 NERVE PAIN: ICD-10-CM

## 2018-11-08 PROCEDURE — 99204 OFFICE O/P NEW MOD 45 MIN: CPT | Performed by: PHYSICAL MEDICINE & REHABILITATION

## 2018-11-08 RX ORDER — CYCLOBENZAPRINE HCL 10 MG
TABLET ORAL
Refills: 0 | COMMUNITY
Start: 2018-09-19 | End: 2019-01-09

## 2018-11-08 ASSESSMENT — ENCOUNTER SYMPTOMS
SENSORY CHANGE: 1
EYE PAIN: 0
HEMOPTYSIS: 0
CLAUDICATION: 0
ORTHOPNEA: 0
SPUTUM PRODUCTION: 0
HEARTBURN: 0
EYE DISCHARGE: 0
FEVER: 0
CHILLS: 0
PHOTOPHOBIA: 0
BLOOD IN STOOL: 0
VOMITING: 0
CONSTIPATION: 0
NAUSEA: 0
ABDOMINAL PAIN: 1
MYALGIAS: 1

## 2018-11-09 NOTE — PROGRESS NOTES
Subjective:      Zachary Massey is a 33 y.o. male who presents with New Patient    Chief complaint: Left chest wall pain        HPI The patient notes the onset of left sided chest and abdominal area pain approximately 2 months ago without specific event or trauma.  The patient had evaluation by primary care, also diagnostic studies, no defined etiology as of yet, cardiology consulted.    Regarding today's visit:    The patient notes ongoing pain in the left anterior lateral chest wall, primarily in the mid and lower aspect, neuropathic component, constant, limiting his ability to function, including sitting tolerance as well as household activities.  No overt cardiopulmonary symptoms noted.  The patient notes only minimal back pain, controlled, without radicular component    The patient notes the left upper quadrant pain, relatively controlled, denies overt dyspepsia or GERD symptoms, no nausea vomiting, no blood in urine or stool.    The patient notes right distal leg and foot area pain.  He suffered remote right foot fracture from blunt injury, nonsurgical management.  The patient has mild right leg swelling, chronic.  The patient notes intermittent/chronic right leg cellulitis, approximately 5 episodes over the last year.    The patient has had prior treatment with medications.  No acute changes with bowel/bladder noted.  No acute changes with strength noted.  The ongoing pain limits his ability to function.  He is inquiring about additional treatment options.      MEDICAL RECORDS REVIEW/DATA REVIEW: Reviewed in epic.    Records Reviewed: Reviewed referring provider notes.     I reviewed medications.  Prescribed Flexeril and gabapentin, only minimally helpful.    I reviewed diagnostic studies:     I reviewed radiographs.     Reviewed left rib series 9/2018, I reviewed images and report, unremarkable.    Reviewed CT abdomen 9/2018, unremarkable    Reviewed left foot x-rays 8/209, demonstrated metatarsal  fractures.    Reviewed EKG 10/2018.    I reviewed lab studies.  Reviewed labs 10/2018, including CMP, note blood sugar of 126, elevated LFTs, low vitamin D, normal TFTs.  Reviewed labs 2015, including CBC per    I reviewed medical issues.     I reviewed family history: No neuromuscular disorders noted.  Brother  of acute leukemia, provided BMT for brother.    I reviewed social issues.  , notes difficulty with job duties due to ongoing pain      PAST MEDICAL HISTORY:   Past Medical History:   Diagnosis Date   • Foot injury     fracture when 21 yrs       PAST SURGICAL HISTORY:    Past Surgical History:   Procedure Laterality Date   • OTHER      donor for bone marrow transplant   • PB REMOVAL OF NAIL BED  right great toe.        ALLERGIES:  Robitussin allergy-cough [[id number]] and Robitussin [guiatuss]    MEDICATIONS:    Outpatient Encounter Prescriptions as of 2018   Medication Sig Dispense Refill   • vitamin D, Ergocalciferol, (DRISDOL) 94265 units Cap capsule Take 1 Cap by mouth every 7 days. 12 Cap 0   • cyclobenzaprine (FLEXERIL) 10 MG Tab TK 1 T PO TID PRF MILD PAIN OR MODERATE PAIN. DO NOT TK WHILE DRIVING OR WORKING  0   • gabapentin (NEURONTIN) 100 MG Cap Take 1 tab at bedtime for one week, then take 1 tab twice a day for a week, then 1 tab 3 times a day thereafter (Patient not taking: Reported on 2018) 90 Cap 1     No facility-administered encounter medications on file as of 2018.        SOCIAL HISTORY:    Social History     Social History   • Marital status: Single     Spouse name: N/A   • Number of children: N/A   • Years of education: N/A     Social History Main Topics   • Smoking status: Former Smoker     Packs/day: 0.50     Types: Cigarettes     Quit date: 2018   • Smokeless tobacco: Never Used      Comment: 1/2 pack a day.   • Alcohol use Yes      Comment: occ   • Drug use: No   • Sexual activity: No     Other Topics Concern   •  Service No  "  • Blood Transfusions No   • Caffeine Concern No   • Occupational Exposure No   • Hobby Hazards Yes     build cars   • Sleep Concern Yes   • Stress Concern No   • Weight Concern Yes   • Special Diet No   • Back Care No   • Exercise No   • Bike Helmet Yes   • Seat Belt Yes   • Self-Exams Yes     Social History Narrative   • No narrative on file       Review of Systems   Constitutional: Negative for chills and fever.   HENT: Negative for congestion, ear discharge, ear pain and nosebleeds.    Eyes: Negative for photophobia, pain and discharge.   Respiratory: Negative for hemoptysis and sputum production.    Cardiovascular: Negative for orthopnea and claudication.   Gastrointestinal: Positive for abdominal pain. Negative for blood in stool, constipation, heartburn, melena, nausea and vomiting.   Genitourinary: Negative for dysuria, frequency and urgency.   Musculoskeletal: Positive for myalgias.   Skin: Negative.    Neurological: Positive for sensory change.         Objective:     /82 (BP Location: Left arm, Patient Position: Sitting, BP Cuff Size: Adult)   Pulse 91   Temp 36.3 °C (97.3 °F) (Temporal)   Ht 1.854 m (6' 1\")   Wt (!) 135.2 kg (298 lb 1 oz)   SpO2 96%   BMI 39.32 kg/m²      Physical Exam   Constitutional: oriented to person, place, and time, appears well-developed and well-nourished.   HEENT: Normocephalic atraumatic, neck supple, no JVD noted, no masses noted, no meningeal signs noted  Lymphadenopathy: no cervical, supraclavicular, or inguinal lymphadenopathy noted  Cardiovascular: Intact distal pulses, including at ankles, mild right lower limb swelling noted, negative Homans  Pulmonary/chest wall: Tender with palpation left anterior lateral chest wall, middle and lower aspect, pain with range of motion testing, no skin changes noted, No tachypnea noted, no accessory muscle use noted, no dyspnea noted  Abdominal: Soft, mild left upper quadrant tenderness, exhibits no distension, no peritoneal " signs, no HSM  Musculoskeletal:   Hip: exhibits no tenderness, no significant pain with range of motion testing  Back: Minimal tenderness with palpation in thoracolumbar spine region  Lower limb: Tender with palpation distal aspect right leg, mild tenderness right ankle and foot region, mild pain with right ankle/foot range of motion testing, no signs of infection  Neurological: oriented to person, place, and time. Cranial nerves grossly intact, normal strength. Sensation intact distally. Reflexes 1+ in lower limbs, Gait mildly antalgic, reciprocal, No upper motor neuron signs evident  Skin: Skin is intact. no rashes or lesions noted  Psychiatric: normal mood and affect. speech is normal and behavior is normal. Judgment and thought content normal.     Assessment/Plan:       ASSESSMENT:    1.  Persistent/refractory left-sided chest wall pain, nerve pain, etiology unclear consider secondary/soft tissue pathology    - DX-CHEST-2 VIEWS; Future  - MR-CHEST-W/O; Future  - Note, this area of pain does not appear amenable to physical therapy treatment trial    2.  Right leg/foot/ankle pain, sprain strain, remote right foot metatarsal fractures with nonsurgical care, history of recurrent right leg cellulitis, chronic right lower limb swelling, concern for secondary pathology versus DVT    - DX-TIBIA AND FIBULA RIGHT; Future  - DX-FOOT-COMPLETE 3+ RIGHT; Future  - US-EXTREMITY VENOUS LOWER UNILAT RIGHT; Future  - Reviewed precautions    3.  Comorbid medical issues, including glucose intolerance, elevated LFTs, history of abdominal pain, low vitamin D, with care per primary care provider and consultants, note cardiology consulted    - Obtain A1C, ordered by primary care provider  - Ordered additional laboratory screen, including CBC and ESR      DISCUSSION/PLAN:    - I discussed management options. I reviewed symptomatic care    - I reviewed home exercise program, with medical precautions    - The patient can consider  complementary trial with TENS unit    - I reviewed medication monitoring.  Medications per primary care provider.  I did not prescribe any medications today.    - I reviewed additional diagnostic options, including further/advanced imaging    - I reviewed additional therapeutic options, including injection/interventional therapy and additional consultative input    - Return after the above-noted diagnostic studies or an as-needed basis      Please note that this dictation was created using voice recognition software. I have made every reasonable attempt to correct obvious errors but there may be errors of grammar and content that I may have overlooked prior to finalization of this note.

## 2018-11-26 ENCOUNTER — TELEPHONE (OUTPATIENT)
Dept: MEDICAL GROUP | Facility: PHYSICIAN GROUP | Age: 33
End: 2018-11-26

## 2018-11-26 ENCOUNTER — APPOINTMENT (OUTPATIENT)
Dept: RADIOLOGY | Facility: MEDICAL CENTER | Age: 33
End: 2018-11-26
Attending: PHYSICAL MEDICINE & REHABILITATION
Payer: COMMERCIAL

## 2018-11-26 DIAGNOSIS — M79.671 RIGHT FOOT PAIN: ICD-10-CM

## 2018-11-26 DIAGNOSIS — R07.89 LEFT-SIDED CHEST WALL PAIN: ICD-10-CM

## 2018-11-26 DIAGNOSIS — R07.89 CHEST WALL PAIN: ICD-10-CM

## 2018-11-26 DIAGNOSIS — M79.2 NERVE PAIN: ICD-10-CM

## 2018-11-26 DIAGNOSIS — M79.604 RIGHT LEG PAIN: ICD-10-CM

## 2018-11-26 PROCEDURE — 71046 X-RAY EXAM CHEST 2 VIEWS: CPT

## 2018-11-26 PROCEDURE — 73590 X-RAY EXAM OF LOWER LEG: CPT | Mod: RT

## 2018-11-26 PROCEDURE — 73630 X-RAY EXAM OF FOOT: CPT | Mod: RT

## 2018-11-26 NOTE — TELEPHONE ENCOUNTER
Please schedule patient to come in for EKG 1 to 2 days prior to cardiology consultation appointment on 12/18/18.  Thanks

## 2018-11-27 ENCOUNTER — HOSPITAL ENCOUNTER (OUTPATIENT)
Dept: LAB | Facility: MEDICAL CENTER | Age: 33
End: 2018-11-27
Attending: PHYSICAL MEDICINE & REHABILITATION
Payer: COMMERCIAL

## 2018-11-27 ENCOUNTER — HOSPITAL ENCOUNTER (OUTPATIENT)
Dept: LAB | Facility: MEDICAL CENTER | Age: 33
End: 2018-11-27
Attending: NURSE PRACTITIONER
Payer: COMMERCIAL

## 2018-11-27 ENCOUNTER — APPOINTMENT (OUTPATIENT)
Dept: RADIOLOGY | Facility: MEDICAL CENTER | Age: 33
End: 2018-11-27
Attending: PHYSICAL MEDICINE & REHABILITATION
Payer: COMMERCIAL

## 2018-11-27 DIAGNOSIS — R07.89 CHEST WALL PAIN: ICD-10-CM

## 2018-11-27 DIAGNOSIS — M79.2 NERVE PAIN: ICD-10-CM

## 2018-11-27 DIAGNOSIS — M79.89 LEG SWELLING: ICD-10-CM

## 2018-11-27 DIAGNOSIS — M79.671 RIGHT FOOT PAIN: ICD-10-CM

## 2018-11-27 DIAGNOSIS — R73.9 BLOOD GLUCOSE ELEVATED: ICD-10-CM

## 2018-11-27 DIAGNOSIS — M79.604 RIGHT LEG PAIN: ICD-10-CM

## 2018-11-27 LAB
ALBUMIN SERPL BCP-MCNC: 4.2 G/DL (ref 3.2–4.9)
ALBUMIN/GLOB SERPL: 1.7 G/DL
ALP SERPL-CCNC: 117 U/L (ref 30–99)
ALT SERPL-CCNC: 87 U/L (ref 2–50)
ANION GAP SERPL CALC-SCNC: 7 MMOL/L (ref 0–11.9)
AST SERPL-CCNC: 35 U/L (ref 12–45)
BASOPHILS # BLD AUTO: 1.1 % (ref 0–1.8)
BASOPHILS # BLD: 0.07 K/UL (ref 0–0.12)
BILIRUB SERPL-MCNC: 0.7 MG/DL (ref 0.1–1.5)
BUN SERPL-MCNC: 14 MG/DL (ref 8–22)
CALCIUM SERPL-MCNC: 9 MG/DL (ref 8.5–10.5)
CHLORIDE SERPL-SCNC: 106 MMOL/L (ref 96–112)
CO2 SERPL-SCNC: 26 MMOL/L (ref 20–33)
CREAT SERPL-MCNC: 0.87 MG/DL (ref 0.5–1.4)
EOSINOPHIL # BLD AUTO: 0.15 K/UL (ref 0–0.51)
EOSINOPHIL NFR BLD: 2.4 % (ref 0–6.9)
ERYTHROCYTE [DISTWIDTH] IN BLOOD BY AUTOMATED COUNT: 41.2 FL (ref 35.9–50)
ERYTHROCYTE [SEDIMENTATION RATE] IN BLOOD BY WESTERGREN METHOD: 4 MM/HOUR (ref 0–15)
EST. AVERAGE GLUCOSE BLD GHB EST-MCNC: 148 MG/DL
FASTING STATUS PATIENT QL REPORTED: NORMAL
GLOBULIN SER CALC-MCNC: 2.5 G/DL (ref 1.9–3.5)
GLUCOSE SERPL-MCNC: 114 MG/DL (ref 65–99)
HBA1C MFR BLD: 6.8 % (ref 0–5.6)
HCT VFR BLD AUTO: 46 % (ref 42–52)
HGB BLD-MCNC: 15.9 G/DL (ref 14–18)
IMM GRANULOCYTES # BLD AUTO: 0.03 K/UL (ref 0–0.11)
IMM GRANULOCYTES NFR BLD AUTO: 0.5 % (ref 0–0.9)
LYMPHOCYTES # BLD AUTO: 2.44 K/UL (ref 1–4.8)
LYMPHOCYTES NFR BLD: 39.5 % (ref 22–41)
MCH RBC QN AUTO: 29.8 PG (ref 27–33)
MCHC RBC AUTO-ENTMCNC: 34.6 G/DL (ref 33.7–35.3)
MCV RBC AUTO: 86.3 FL (ref 81.4–97.8)
MONOCYTES # BLD AUTO: 0.58 K/UL (ref 0–0.85)
MONOCYTES NFR BLD AUTO: 9.4 % (ref 0–13.4)
NEUTROPHILS # BLD AUTO: 2.91 K/UL (ref 1.82–7.42)
NEUTROPHILS NFR BLD: 47.1 % (ref 44–72)
NRBC # BLD AUTO: 0 K/UL
NRBC BLD-RTO: 0 /100 WBC
PLATELET # BLD AUTO: 216 K/UL (ref 164–446)
PMV BLD AUTO: 11.1 FL (ref 9–12.9)
POTASSIUM SERPL-SCNC: 3.9 MMOL/L (ref 3.6–5.5)
PROT SERPL-MCNC: 6.7 G/DL (ref 6–8.2)
RBC # BLD AUTO: 5.33 M/UL (ref 4.7–6.1)
SODIUM SERPL-SCNC: 139 MMOL/L (ref 135–145)
WBC # BLD AUTO: 6.2 K/UL (ref 4.8–10.8)

## 2018-11-27 PROCEDURE — 80053 COMPREHEN METABOLIC PANEL: CPT

## 2018-11-27 PROCEDURE — 83036 HEMOGLOBIN GLYCOSYLATED A1C: CPT

## 2018-11-27 PROCEDURE — 85025 COMPLETE CBC W/AUTO DIFF WBC: CPT

## 2018-11-27 PROCEDURE — 85652 RBC SED RATE AUTOMATED: CPT

## 2018-11-27 PROCEDURE — 36415 COLL VENOUS BLD VENIPUNCTURE: CPT

## 2018-11-28 ENCOUNTER — OFFICE VISIT (OUTPATIENT)
Dept: MEDICAL GROUP | Facility: PHYSICIAN GROUP | Age: 33
End: 2018-11-28
Payer: COMMERCIAL

## 2018-11-28 DIAGNOSIS — R10.812 LEFT UPPER QUADRANT ABDOMINAL TENDERNESS WITHOUT REBOUND TENDERNESS: ICD-10-CM

## 2018-11-28 DIAGNOSIS — R74.01 ELEVATED ALANINE AMINOTRANSFERASE (ALT) LEVEL: ICD-10-CM

## 2018-11-28 DIAGNOSIS — R07.89 LEFT-SIDED CHEST WALL PAIN: ICD-10-CM

## 2018-11-28 DIAGNOSIS — R73.03 PREDIABETES: ICD-10-CM

## 2018-11-28 DIAGNOSIS — R73.9 BLOOD GLUCOSE ELEVATED: ICD-10-CM

## 2018-11-28 DIAGNOSIS — R74.01 ELEVATED ALT MEASUREMENT: ICD-10-CM

## 2018-11-28 PROCEDURE — 99214 OFFICE O/P EST MOD 30 MIN: CPT | Performed by: NURSE PRACTITIONER

## 2018-11-28 ASSESSMENT — PAIN SCALES - GENERAL: PAINLEVEL: NO PAIN

## 2018-11-29 VITALS
HEIGHT: 73 IN | OXYGEN SATURATION: 95 % | HEART RATE: 92 BPM | WEIGHT: 298 LBS | RESPIRATION RATE: 20 BRPM | DIASTOLIC BLOOD PRESSURE: 71 MMHG | SYSTOLIC BLOOD PRESSURE: 118 MMHG | BODY MASS INDEX: 39.49 KG/M2 | TEMPERATURE: 98.1 F

## 2018-11-29 PROBLEM — R74.01 ELEVATED ALT MEASUREMENT: Status: ACTIVE | Noted: 2018-11-29

## 2018-11-29 PROBLEM — R73.03 PREDIABETES: Status: ACTIVE | Noted: 2018-10-31

## 2018-11-29 PROBLEM — R79.89 ELEVATED LFTS: Status: ACTIVE | Noted: 2018-11-29

## 2018-11-29 PROBLEM — R74.01 ELEVATED ALANINE AMINOTRANSFERASE (ALT) LEVEL: Status: ACTIVE | Noted: 2018-11-29

## 2018-11-29 NOTE — ASSESSMENT & PLAN NOTE
Patient is established with Dr. Malave in physiatry.  He is currently being worked up for left-sided chest wall pain.  Patient will call to schedule follow-up appointment with Dr. Malave.  Patient is still unable to work secondary to pain that is exacerbated by sitting for long periods.  Patient is a .

## 2018-11-29 NOTE — PROGRESS NOTES
CC: Review labs, prediabetes    HISTORY OF THE PRESENT ILLNESS: Patient is a 33 y.o. male. This pleasant patient is here today, accompanied by wife, to review labs and for evaluation management following health problems.    Prediabetes  CMP he did, fasting blood glucose decreased from 126-114 after starting low carbohydrate diet.  Hemoglobin A1c is 6.8.  Denies vision changes, polyuria, polydipsia, numbness and tingling.  Discussed options including continuing to work on lifestyle measures and/or starting medication such as metformin.  Patient would really like to work on lifestyle measures some more as he has seen a difference in his glucose with reducing carbohydrates in diet.  Will repeat hemoglobin A1c and CMP prior to next appointment.    Elevated ALT measurement  Elevated ALT, increased in the last month.  Patient denies right upper quadrant pain.  Drinks alcohol occasionally.  Up-to-date recommends hepatitis panel, ferritin level, TIBC, and ultrasound of right upper quadrant for further workup.  Discussed workup with patient.  He would like to wait 3 more months and reevaluate liver function tests at that time.  If ALT continues to increase, will do workup.    Component      Latest Ref Rng & Units 10/4/2018 11/27/2018          10:39 AM 10:00 AM   ALT(SGPT)      2 - 50 U/L 77 (H) 87 (H)   Alkaline Phosphatase      30 - 99 U/L 123 (H) 117 (H)           Left-sided chest wall pain  Patient is established with Dr. Malave in physiatry.  He is currently being worked up for left-sided chest wall pain.  Patient will call to schedule follow-up appointment with Dr. Malave.  Patient is still unable to work secondary to pain that is exacerbated by sitting for long periods.  Patient is a .      Allergies: Robitussin allergy-cough [[id number]-[id number]-rg] and Robitussin [guiatuss]    Current Outpatient Prescriptions Ordered in [facility]   Medication Sig Dispense Refill   • cyclobenzaprine (FLEXERIL) 10 MG Tab TK 1 T  "PO TID PRF MILD PAIN OR MODERATE PAIN. DO NOT TK WHILE DRIVING OR WORKING  0   • gabapentin (NEURONTIN) 100 MG Cap Take 1 tab at bedtime for one week, then take 1 tab twice a day for a week, then 1 tab 3 times a day thereafter (Patient not taking: Reported on 11/8/2018) 90 Cap 1   • vitamin D, Ergocalciferol, (DRISDOL) 99352 units Cap capsule Take 1 Cap by mouth every 7 days. (Patient not taking: Reported on 11/28/2018) 12 Cap 0     No current Epic-ordered facility-administered medications on file.        Past Medical History:   Diagnosis Date   • Foot injury     fracture when 21 yrs       Past Surgical History:   Procedure Laterality Date   • OTHER  2003    donor for bone marrow transplant   • PB REMOVAL OF NAIL BED  right great toe.        Social History   Substance Use Topics   • Smoking status: Former Smoker     Packs/day: 0.50     Types: Cigarettes     Quit date: 8/1/2018   • Smokeless tobacco: Never Used      Comment: 1/2 pack a day.   • Alcohol use 0.6 oz/week     1 Shots of liquor per week      Comment: occ       Family History   Problem Relation Age of Onset   • Diabetes Mother    • Heart Attack Father    • Hypertension Father    • Other Brother         leukemia   • Diabetes Paternal Aunt        ROS:   As in HPI, otherwise negative for chest pain, dyspnea, abdominal pain, fever           Exam: Blood pressure 118/71, pulse 92, temperature 36.7 °C (98.1 °F), temperature source Temporal, resp. rate 20, height 1.854 m (6' 1\"), weight (!) 135.2 kg (298 lb), SpO2 95 %. Body mass index is 39.32 kg/m².    General: Alert, pleasant, obese habitus, well nourished, well developed male in NAD  Eyes:  Sclera non-icteric.  Neck: Supple without bruit. Thyroid is not enlarged.  Pulmonary: Clear to ausculation.  Normal effort. No rales, ronchi, or wheezing.  Cardiovascular: Normal rate and rhythm without murmur. Carotid and radial pulses are intact and equal bilaterally.  No lower extremity edema.  Neurologic: Grossly " nonfocal  Abdomen: Soft, nontender to palpation right upper quadrant.  Tender to palpation left upper quadrant.  Lymph: No cervical or supraclavicular lymph nodes are palpable  Skin: Warm and dry.  No obvious lesions.  Nonicteric  Musculoskeletal: Normal gait.   Psych: Normal mood and affect. Alert and oriented. Judgment and insight is normal.    Please note that this dictation was created using voice recognition software. I have made every reasonable attempt to correct obvious errors, but I expect that there are errors of grammar and possibly content that I did not discover before finalizing the note.      Assessment/Plan  1. Elevated alanine aminotransferase (ALT) level  Continue to monitor.  Consider further workup after next lab results.  - COMP METABOLIC PANEL; Future    2. Blood glucose elevated    - HEMOGLOBIN A1C; Future  - COMP METABOLIC PANEL; Future    3. Prediabetes  Patient will continue with lifestyle measures.  Will consider metformin at next appointment pending lab results.  - HEMOGLOBIN A1C; Future  - COMP METABOLIC PANEL; Future    4. Left-sided chest wall pain  Continue follow-up with pain management, Dr. Malave.  Patient will return to clinic if further [place] paperwork is needed.  Patient will be sure to follow-up with pain management prior to me filling out further FMLA paperwork.    5. Left upper quadrant abdominal tenderness without rebound tenderness    - AMYLASE; Future  - LIPASE; Future    Patient will return to clinic in 3 months prediabetes and lab review.  Patient will return to clinic sooner if needed.

## 2018-11-29 NOTE — ASSESSMENT & PLAN NOTE
Elevated ALT, increased in the last month.  Patient denies right upper quadrant pain.  Drinks alcohol occasionally.  Up-to-date recommends hepatitis panel, ferritin level, TIBC, and ultrasound of right upper quadrant for further workup.  Discussed workup with patient.  He would like to wait 3 more months and reevaluate liver function tests at that time.  If ALT continues to increase, will do workup.    Component      Latest Ref Rng & Units 10/4/2018 11/27/2018          10:39 AM 10:00 AM   ALT(SGPT)      2 - 50 U/L 77 (H) 87 (H)   Alkaline Phosphatase      30 - 99 U/L 123 (H) 117 (H)

## 2018-11-29 NOTE — ASSESSMENT & PLAN NOTE
CMP he did, fasting blood glucose decreased from 126-114 after starting low carbohydrate diet.  Hemoglobin A1c is 6.8.  Denies vision changes, polyuria, polydipsia, numbness and tingling.  Discussed options including continuing to work on lifestyle measures and/or starting medication such as metformin.  Patient would really like to work on lifestyle measures some more as he has seen a difference in his glucose with reducing carbohydrates in diet.  Will repeat hemoglobin A1c and CMP prior to next appointment.

## 2018-12-06 ENCOUNTER — HOSPITAL ENCOUNTER (OUTPATIENT)
Dept: RADIOLOGY | Facility: MEDICAL CENTER | Age: 33
End: 2018-12-06
Attending: PHYSICAL MEDICINE & REHABILITATION
Payer: COMMERCIAL

## 2018-12-06 DIAGNOSIS — M79.2 NERVE PAIN: ICD-10-CM

## 2018-12-06 DIAGNOSIS — R07.89 LEFT-SIDED CHEST WALL PAIN: ICD-10-CM

## 2018-12-06 PROCEDURE — 71550 MRI CHEST W/O DYE: CPT

## 2018-12-13 ENCOUNTER — APPOINTMENT (OUTPATIENT)
Dept: PHYSICAL MEDICINE AND REHAB | Facility: MEDICAL CENTER | Age: 33
End: 2018-12-13
Payer: COMMERCIAL

## 2018-12-17 ENCOUNTER — OFFICE VISIT (OUTPATIENT)
Dept: PHYSICAL MEDICINE AND REHAB | Facility: MEDICAL CENTER | Age: 33
End: 2018-12-17
Payer: COMMERCIAL

## 2018-12-17 VITALS
DIASTOLIC BLOOD PRESSURE: 82 MMHG | BODY MASS INDEX: 38.83 KG/M2 | OXYGEN SATURATION: 95 % | HEIGHT: 73 IN | HEART RATE: 86 BPM | WEIGHT: 293 LBS | SYSTOLIC BLOOD PRESSURE: 118 MMHG | TEMPERATURE: 98.6 F

## 2018-12-17 DIAGNOSIS — M79.2 NERVE PAIN: ICD-10-CM

## 2018-12-17 DIAGNOSIS — R07.89 CHEST WALL PAIN: ICD-10-CM

## 2018-12-17 DIAGNOSIS — L98.9 SKIN LESION: ICD-10-CM

## 2018-12-17 DIAGNOSIS — M79.9 SOFT TISSUE LESION: ICD-10-CM

## 2018-12-17 DIAGNOSIS — M79.89 LEG SWELLING: ICD-10-CM

## 2018-12-17 PROCEDURE — 99213 OFFICE O/P EST LOW 20 MIN: CPT | Performed by: PHYSICAL MEDICINE & REHABILITATION

## 2018-12-17 ASSESSMENT — ENCOUNTER SYMPTOMS
NAUSEA: 0
BLOOD IN STOOL: 0
MYALGIAS: 1
SPUTUM PRODUCTION: 0
EYE DISCHARGE: 0
EYE PAIN: 0
SENSORY CHANGE: 1
ABDOMINAL PAIN: 1
FEVER: 0
ORTHOPNEA: 0
CLAUDICATION: 0
HEMOPTYSIS: 0
CONSTIPATION: 0
HEARTBURN: 0
PHOTOPHOBIA: 0
VOMITING: 0
CHILLS: 0

## 2018-12-17 NOTE — PROGRESS NOTES
Subjective:      Zachary Massey is a 33 y.o. male who presents with Follow-Up    Chief complaint: Left-sided chest wall pain        HPI The patient notes the onset of left sided chest and abdominal area pain approximately 3 months ago without specific event or trauma.  The patient had evaluation by primary care, also diagnostic studies, no defined etiology as of yet, cardiology consulted.    Regarding today's visit:    The patient notes ongoing pain without significant change, primarily in the left anterior mid chest wall region, neuropathic component, limiting his ability to function, including ADLs.  No overt cardiopulmonary symptoms noted.  The patient notes only minimal back pain, controlled, without radicular component    The patient notes prior left upper quadrant pain, relatively controlled, denies overt dyspepsia or GERD symptoms, no nausea vomiting, no blood in urine or stool.    The patient notes chronic right distal leg and foot area pain.  He suffered remote right foot fracture from blunt injury, nonsurgical management.  The patient has mild right leg swelling, chronic.  The patient notes intermittent/chronic right leg cellulitis, approximately 5 episodes over the last year.    The patient has had prior treatment with medications.  No acute changes with bowel/bladder noted.  No acute changes with strength noted.  The ongoing pain limits his ability to function.  He is inquiring about additional treatment options.      MEDICAL RECORDS REVIEW/DATA REVIEW: Reviewed in epic.    I reviewed medications.  Prescribed Flexeril and gabapentin, only minimally helpful.    I reviewed diagnostic studies:       I reviewed radiographs.     Reviewed MRI chest 12/2018, I reviewed images and report, see below, note skin/subcutaneous lesion in region of pain, etiology unclear    Reviewed chest x-ray 11/2018, I reviewed images and report, unremarkable     Reviewed left rib series 9/2018, unremarkable.    Reviewed CT abdomen  2018, unremarkable    Reviewed right tibia/fibula x-rays 2018, I reviewed images and report, unremarkable    Reviewed right foot x-rays 2018, I reviewed images and report, unremarkable    Reviewed left foot x-rays , demonstrated metatarsal fractures.    Reviewed EKG 10/2018.      I reviewed lab studies.      Reviewed labs 2018, including A1c of 6.8, CMP, note mildly elevated LFTs.,  Normal CBC and ESR.    Reviewed labs 10/2018, including CMP, noted low vitamin D, normal TFTs.        I reviewed medical issues.     I reviewed family history: No neuromuscular disorders noted.  Brother  of acute leukemia, provided BMT for brother.    I reviewed social issues.  , notes difficulty with job duties due to ongoing pain      2018 7:05 PM    HISTORY/REASON FOR EXAM:  Left anterior chest wall pain.      TECHNIQUE/EXAM DESCRIPTION:  MRI of the chest .    The study was performed on a Crista 1.5 Colleen MRI scanner. T1 axial, T2 fat-suppressed axial, T1 coronal, T2 fat-suppressed coronal and T1 sagittal images were obtained of the chest.    COMPARISON:  None.    FINDINGS:  Marker was placed on the left anterior chest wall region. There appears to be increased T2 signal on the fat-saturated T2-weighted images at and just below the skin surface measuring 0.34 cm in thickness and 4.6 cm in diameter. No signal abnormality or   mass deep to this lesion is appreciated. Remaining subcutaneous fat demonstrates normal signal with no evidence of mass or fluid collection. Chest wall musculature appears to be intact. No enlargement or signal abnormality within the chest wall   musculature at this location is appreciated. No abnormal osseous signal is identified within the ribs or chest wall. No abnormal vascular structures are appreciated in the tissues adjacent to the marker.   Impression       1.  The skin and subcutaneous tissues measuring 0.34 cm in thickness at site of palpable abnormality demonstrate  increased T2 signal on the fat-saturated images. No significant signal abnormality is noted on the T1-weighted images at this location.   Inflammation or infection involving the skin and immediate subcutaneous tissues is a possibility. No underlying mass or fluid collection within the subcutaneous fat is noted which demonstrates entirely normal signal. No other abnormalities are noted in   the chest wall.       PAST MEDICAL HISTORY:   Past Medical History:   Diagnosis Date   • Foot injury     fracture when 21 yrs       PAST SURGICAL HISTORY:    Past Surgical History:   Procedure Laterality Date   • OTHER  2003    donor for bone marrow transplant   • PB REMOVAL OF NAIL BED  right great toe.        ALLERGIES:  Robitussin allergy-cough [[id number]] and Robitussin [guiatuss]    MEDICATIONS:    Outpatient Encounter Prescriptions as of 12/17/2018   Medication Sig Dispense Refill   • vitamin D, Ergocalciferol, (DRISDOL) 12785 units Cap capsule Take 1 Cap by mouth every 7 days. 12 Cap 0   • cyclobenzaprine (FLEXERIL) 10 MG Tab TK 1 T PO TID PRF MILD PAIN OR MODERATE PAIN. DO NOT TK WHILE DRIVING OR WORKING  0   • gabapentin (NEURONTIN) 100 MG Cap Take 1 tab at bedtime for one week, then take 1 tab twice a day for a week, then 1 tab 3 times a day thereafter (Patient not taking: Reported on 11/8/2018) 90 Cap 1     No facility-administered encounter medications on file as of 12/17/2018.        SOCIAL HISTORY:    Social History     Social History   • Marital status: Single     Spouse name: N/A   • Number of children: N/A   • Years of education: N/A     Social History Main Topics   • Smoking status: Former Smoker     Packs/day: 0.50     Types: Cigarettes     Quit date: 8/1/2018   • Smokeless tobacco: Never Used      Comment: 1/2 pack a day.   • Alcohol use 0.6 oz/week     1 Shots of liquor per week      Comment: occ   • Drug use: No   • Sexual activity: No     Other Topics Concern   •  Service No   • Blood  "Transfusions No   • Caffeine Concern No   • Occupational Exposure No   • Hobby Hazards Yes     build cars   • Sleep Concern Yes   • Stress Concern No   • Weight Concern Yes   • Special Diet No   • Back Care No   • Exercise No   • Bike Helmet Yes   • Seat Belt Yes   • Self-Exams Yes     Social History Narrative   • No narrative on file       Review of Systems   Constitutional: Negative for chills and fever.   HENT: Negative for congestion, ear discharge, ear pain and nosebleeds.    Eyes: Negative for photophobia, pain and discharge.   Respiratory: Negative for hemoptysis and sputum production.    Cardiovascular: Negative for orthopnea and claudication.   Gastrointestinal: Positive for abdominal pain. Negative for blood in stool, constipation, heartburn, melena, nausea and vomiting.   Genitourinary: Negative for dysuria, frequency and urgency.   Musculoskeletal: Positive for myalgias.   Skin: Negative.    Neurological: Positive for sensory change.         Objective:     /82 (BP Location: Left arm, Patient Position: Sitting, BP Cuff Size: Adult)   Pulse 86   Temp 37 °C (98.6 °F) (Temporal)   Ht 1.854 m (6' 1\")   Wt (!) 132.9 kg (293 lb)   SpO2 95%   BMI 38.66 kg/m²      Physical Exam   Constitutional: oriented to person, place, and time, appears well-developed and well-nourished.   HEENT: Normocephalic atraumatic, neck supple, no JVD noted, no masses noted, no meningeal signs noted  Lymphadenopathy: no cervical, supraclavicular, or inguinal lymphadenopathy noted  Cardiovascular: Intact distal pulses, including at wrists and ankles, no limb swelling noted  Pulmonary: No tachypnea noted, no accessory muscle use noted, no dyspnea noted  Abdominal: Soft, nontender, exhibits no distension, no peritoneal signs, no HSM  Musculoskeletal:   Shoulder: No significant tenderness, no significant pain with range of motion testing  Thoracic/chest wall: Tender with palpation left anterior mid chest wall, reproducing pain, " no skin changes noted  Cervical back: No significant tenderness, no significant pain with range of motion testing  Lower limb: Without change, mild swelling noted, negative Homans  Neurological: oriented to person, place, and time. Cranial nerves grossly intact, normal strength. Sensation intact distally. Reflexes 1-2+ in upper and lower limbs, Gait steady, reciprocal  Skin: Skin is intact. no rashes or lesions noted  Psychiatric: normal mood and affect. speech is normal and behavior is normal.        Assessment/Plan:       ASSESSMENT:    1.  Persistent/refractory left-sided chest wall pain, nerve pain, skin/soft tissue lesion noted on MRI chest 12/2018, and region of pain, etiology unclear    - Submitted general surgery consultation for further evaluation    2.  Right leg/foot/ankle pain, sprain strain, remote right foot metatarsal fractures with nonsurgical care, history of recurrent right leg cellulitis, chronic right lower limb swelling, concern for secondary pathology versus DVT    - US-EXTREMITY VENOUS LOWER UNILAT RIGHT; Future  - Reviewed precautions    3.  Comorbid medical issues, including glucose intolerance, elevated LFTs, history of abdominal pain, low vitamin D, with care per primary care provider and consultants, note cardiology consulted      DISCUSSION/PLAN:    - I discussed management options. I reviewed symptomatic care    - I reviewed home exercise program, with medical precautions    - The patient can consider complementary trial with TENS unit    - I reviewed medication monitoring.  Medications per primary care provider.  I did not prescribe any medications today.    - I reviewed additional diagnostic options, including further/advanced imaging    - I reviewed additional therapeutic options, including injection/interventional therapy and additional consultative input    - Return after the above-noted diagnostic study and consultation or an as-needed basis      Please note that this dictation  was created using voice recognition software. I have made every reasonable attempt to correct obvious errors but there may be errors of grammar and content that I may have overlooked prior to finalization of this note.

## 2019-01-09 ENCOUNTER — NON-PROVIDER VISIT (OUTPATIENT)
Dept: MEDICAL GROUP | Facility: PHYSICIAN GROUP | Age: 34
End: 2019-01-09
Payer: COMMERCIAL

## 2019-01-09 ENCOUNTER — TELEMEDICINE2 (OUTPATIENT)
Dept: CARDIOLOGY | Facility: MEDICAL CENTER | Age: 34
End: 2019-01-09
Payer: COMMERCIAL

## 2019-01-09 VITALS
OXYGEN SATURATION: 97 % | HEIGHT: 73 IN | WEIGHT: 292 LBS | DIASTOLIC BLOOD PRESSURE: 76 MMHG | BODY MASS INDEX: 38.7 KG/M2 | SYSTOLIC BLOOD PRESSURE: 118 MMHG | HEART RATE: 83 BPM

## 2019-01-09 DIAGNOSIS — R07.89 LEFT-SIDED CHEST WALL PAIN: ICD-10-CM

## 2019-01-09 DIAGNOSIS — R94.31 NONSPECIFIC ABNORMAL ELECTROCARDIOGRAM (ECG) (EKG): ICD-10-CM

## 2019-01-09 PROBLEM — R74.01 ELEVATED ALT MEASUREMENT: Status: RESOLVED | Noted: 2018-11-29 | Resolved: 2019-01-09

## 2019-01-09 PROBLEM — E55.9 VITAMIN D DEFICIENCY: Status: RESOLVED | Noted: 2018-10-31 | Resolved: 2019-01-09

## 2019-01-09 PROCEDURE — 93000 ELECTROCARDIOGRAM COMPLETE: CPT | Performed by: NURSE PRACTITIONER

## 2019-01-09 PROCEDURE — 99244 OFF/OP CNSLTJ NEW/EST MOD 40: CPT | Performed by: INTERNAL MEDICINE

## 2019-01-09 RX ORDER — IBUPROFEN 200 MG
200 TABLET ORAL EVERY 6 HOURS PRN
COMMUNITY
End: 2022-02-15

## 2019-01-09 ASSESSMENT — ENCOUNTER SYMPTOMS
WEIGHT LOSS: 0
DEPRESSION: 0
PALPITATIONS: 0
SHORTNESS OF BREATH: 0
DIZZINESS: 0
HEARTBURN: 0
BLURRED VISION: 0
COUGH: 0
MYALGIAS: 0
MEMORY LOSS: 0
BRUISES/BLEEDS EASILY: 0
HEMOPTYSIS: 0
LOSS OF CONSCIOUSNESS: 0
NAUSEA: 0
DOUBLE VISION: 0
PND: 0
EYE REDNESS: 0
INSOMNIA: 0

## 2019-01-09 NOTE — PROGRESS NOTES
Chief Complaint   Patient presents with   • Abnormal EKG     new patient       Subjective:   Zachary Massey is a 33 y.o. male who presents today as a new patient.  He is sent in consultation by Ms. 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  Bothered by the chest pain in the past but is seeing physiatry and had an MRI which determined he had a chest wall mass causing this.    Got an EKG in the workup.  Told it was abnormal which was concerning to him.  No other symptoms including dizziness lightheadedness or fatigue.  Likes to walk and considers himself healthy and active    Past Medical History:   Diagnosis Date   • Foot injury     fracture when 21 yrs     Past Surgical History:   Procedure Laterality Date   • OTHER  2003    donor for bone marrow transplant   • PB REMOVAL OF NAIL BED  right great toe.      Family History   Problem Relation Age of Onset   • Diabetes Mother    • Heart Attack Father    • Hypertension Father    • Other Brother         leukemia   • Diabetes Paternal Aunt      Social History     Social History   • Marital status: Single     Spouse name: N/A   • Number of children: N/A   • Years of education: N/A     Occupational History   • Not on file.     Social History Main Topics   • Smoking status: Former Smoker     Packs/day: 0.50     Types: Cigarettes     Quit date: 8/1/2018   • Smokeless tobacco: Never Used      Comment: 1/2 pack a day.   • Alcohol use 0.6 oz/week     1 Shots of liquor per week      Comment: occ   • Drug use: No   • Sexual activity: No     Other Topics Concern   •  Service No   • Blood Transfusions No   • Caffeine Concern No   • Occupational Exposure No   • Hobby Hazards Yes     build cars   • Sleep Concern Yes   • Stress Concern No   • Weight Concern Yes   • Special Diet No   • Back Care No   • Exercise No   • Bike Helmet Yes   • Seat Belt Yes   • Self-Exams Yes     Social History Narrative   • No narrative on  "file     Allergies   Allergen Reactions   • Robitussin Allergy-Cough [[id number]]    • Robitussin [Guiatuss]      Outpatient Encounter Prescriptions as of 1/9/2019   Medication Sig Dispense Refill   • ibuprofen (MOTRIN) 200 MG Tab Take 200 mg by mouth every 6 hours as needed.     • vitamin D, Ergocalciferol, (DRISDOL) 21546 units Cap capsule Take 1 Cap by mouth every 7 days. 12 Cap 0   • [DISCONTINUED] cyclobenzaprine (FLEXERIL) 10 MG Tab TK 1 T PO TID PRF MILD PAIN OR MODERATE PAIN. DO NOT TK WHILE DRIVING OR WORKING  0   • [DISCONTINUED] gabapentin (NEURONTIN) 100 MG Cap Take 1 tab at bedtime for one week, then take 1 tab twice a day for a week, then 1 tab 3 times a day thereafter (Patient not taking: Reported on 11/8/2018) 90 Cap 1     No facility-administered encounter medications on file as of 1/9/2019.      Review of Systems   Constitutional: Negative for malaise/fatigue and weight loss.   HENT: Negative for hearing loss and tinnitus.    Eyes: Negative for blurred vision, double vision and redness.   Respiratory: Negative for cough, hemoptysis and shortness of breath.    Cardiovascular: Negative for palpitations, leg swelling and PND.   Gastrointestinal: Negative for heartburn and nausea.   Musculoskeletal: Negative for joint pain and myalgias.   Skin: Negative for itching and rash.   Neurological: Negative for dizziness and loss of consciousness.   Endo/Heme/Allergies: Does not bruise/bleed easily.   Psychiatric/Behavioral: Negative for depression and memory loss. The patient does not have insomnia.    All other systems reviewed and are negative.       Objective:   /76 (BP Location: Left arm, Patient Position: Sitting)   Pulse 83   Ht 1.854 m (6' 1\")   Wt (!) 132.5 kg (292 lb)   SpO2 97%   BMI 38.52 kg/m²     Physical Exam   Constitutional: He is oriented to person, place, and time. He appears well-developed and well-nourished.   HENT:   Head: Normocephalic and atraumatic.   Eyes: Pupils " are equal, round, and reactive to light. EOM are normal.   Neck: No JVD present. No thyromegaly present.   Cardiovascular: Normal rate, regular rhythm and intact distal pulses.    No murmur heard.  Pulmonary/Chest: Breath sounds normal. No respiratory distress. He exhibits no tenderness.   Abdominal: Bowel sounds are normal. He exhibits no distension.   Musculoskeletal: He exhibits no edema or tenderness.   Neurological: He is alert and oriented to person, place, and time. He exhibits normal muscle tone. Coordination normal.   Skin: Skin is warm and dry. No rash noted.   Psychiatric: He has a normal mood and affect. His behavior is normal.       Assessment:     1. Left-sided chest wall pain     2. Nonspecific abnormal electrocardiogram (ECG) (EKG)         Medical Decision Making:  Today's Assessment / Status / Plan:     I reviewed his twelve-lead EKG from today.  Normal sinus rhythm intraventricular conduction delay, nonspecific, no changes    Abnormal EKG  Talked about abnormal EKGs in general, in the setting of a symptomatology there is not a significant ischemic workup to do  Routine baseline echo to rule out underlying cardiomyopathy or structural heart disease  He has been told he has a murmur in the past we discussed the physiology of this as well    Chest pain  Sounds noncardiac but we discussed this at length as well  Talked about baseline routine testing including new perfusion imaging if he has further concerns or changes    RTC as needed based on testing, questions answered

## 2019-01-22 ENCOUNTER — HOSPITAL ENCOUNTER (OUTPATIENT)
Dept: LAB | Facility: MEDICAL CENTER | Age: 34
End: 2019-01-22
Attending: SURGERY
Payer: COMMERCIAL

## 2019-01-22 LAB — PATHOLOGY CONSULT NOTE: NORMAL

## 2019-01-22 PROCEDURE — 88304 TISSUE EXAM BY PATHOLOGIST: CPT

## 2019-02-06 ENCOUNTER — OFFICE VISIT (OUTPATIENT)
Dept: MEDICAL GROUP | Facility: PHYSICIAN GROUP | Age: 34
End: 2019-02-06
Payer: COMMERCIAL

## 2019-02-06 VITALS
HEIGHT: 73 IN | DIASTOLIC BLOOD PRESSURE: 82 MMHG | BODY MASS INDEX: 37.77 KG/M2 | SYSTOLIC BLOOD PRESSURE: 126 MMHG | OXYGEN SATURATION: 97 % | WEIGHT: 285 LBS | RESPIRATION RATE: 20 BRPM | HEART RATE: 80 BPM | TEMPERATURE: 98.9 F

## 2019-02-06 DIAGNOSIS — R07.89 LEFT-SIDED CHEST WALL PAIN: ICD-10-CM

## 2019-02-06 PROCEDURE — 99213 OFFICE O/P EST LOW 20 MIN: CPT | Performed by: NURSE PRACTITIONER

## 2019-02-06 ASSESSMENT — PAIN SCALES - GENERAL: PAINLEVEL: NO PAIN

## 2019-02-06 ASSESSMENT — PATIENT HEALTH QUESTIONNAIRE - PHQ9: CLINICAL INTERPRETATION OF PHQ2 SCORE: 0

## 2019-02-06 NOTE — LETTER
February 6, 2019    To Whom It May Concern:         This is confirmation that Zachary Massey attended his scheduled appointment with GEORGIA Canales on 2/06/19.    Zachary is released back to work on 2/11/2019.  He is permitted to work without any restrictions.           If you have any questions please do not hesitate to call me at the phone number listed below.    Sincerely,          SARAH Canales.  [phone]

## 2019-02-06 NOTE — ASSESSMENT & PLAN NOTE
Patient reports pain has resolved.  Subcutaneous mass found on MRI.  Surgically removed by Dr. Suarez on 1/11/19.  Patient reports pain has resolved.  Likely that mass was irritating nerve.  Patient reports he was able to drive to [place] last week without any pain.  He follows up with surgeon in a couple days.  Patient is requesting letter to return to work, which I have provided for him.

## 2019-02-19 DIAGNOSIS — R07.89 LEFT-SIDED CHEST WALL PAIN: ICD-10-CM

## 2019-02-19 DIAGNOSIS — R94.31 NONSPECIFIC ABNORMAL ELECTROCARDIOGRAM (ECG) (EKG): ICD-10-CM

## 2019-06-03 ENCOUNTER — NON-PROVIDER VISIT (OUTPATIENT)
Dept: URGENT CARE | Facility: CLINIC | Age: 34
End: 2019-06-03
Payer: COMMERCIAL

## 2019-06-03 DIAGNOSIS — Z02.1 PRE-EMPLOYMENT DRUG SCREENING: ICD-10-CM

## 2019-06-03 LAB
BREATH ALCOHOL COMMENT: NORMAL
POC BREATHALIZER: NORMAL PERCENT (ref 0–0.01)

## 2019-06-03 PROCEDURE — 80305 DRUG TEST PRSMV DIR OPT OBS: CPT | Performed by: PHYSICIAN ASSISTANT

## 2019-06-03 PROCEDURE — 82075 ASSAY OF BREATH ETHANOL: CPT | Performed by: PHYSICIAN ASSISTANT

## 2019-09-06 ENCOUNTER — OFFICE VISIT (OUTPATIENT)
Dept: URGENT CARE | Facility: CLINIC | Age: 34
End: 2019-09-06

## 2019-09-06 VITALS
DIASTOLIC BLOOD PRESSURE: 74 MMHG | OXYGEN SATURATION: 96 % | RESPIRATION RATE: 16 BRPM | WEIGHT: 285.94 LBS | BODY MASS INDEX: 37.72 KG/M2 | TEMPERATURE: 98 F | HEART RATE: 89 BPM | SYSTOLIC BLOOD PRESSURE: 116 MMHG

## 2019-09-06 DIAGNOSIS — S39.012A BACK STRAIN, INITIAL ENCOUNTER: ICD-10-CM

## 2019-09-06 PROCEDURE — 99213 OFFICE O/P EST LOW 20 MIN: CPT | Performed by: PHYSICIAN ASSISTANT

## 2019-09-06 ASSESSMENT — ENCOUNTER SYMPTOMS
BLURRED VISION: 0
CHILLS: 0
WEAKNESS: 0
NAUSEA: 0
HEADACHES: 0
DIZZINESS: 0
FLANK PAIN: 0
COUGH: 0
DOUBLE VISION: 0
ABDOMINAL PAIN: 0
MYALGIAS: 0
TINGLING: 1
SHORTNESS OF BREATH: 0
SENSORY CHANGE: 1
SORE THROAT: 0
VOMITING: 0
FEVER: 0

## 2019-09-06 NOTE — PROGRESS NOTES
"Subjective:      Zachary Massey is a 34 y.o. male who presents with Back Pain (patient needs a work note to return back to work becasue he called out more then 3 days from work )            HPI  34-year-old male presents to urgent care with new problem of left lower and mid right sided back pain with pain radiating down RLE.  Patient reports onset of pain about a week and a half ago secondary to injury while moving.  Patient states he was doing a lot of heavy lifting and strained his back.  Reports intermittent numbness/tingling in BUE described as \"shooting\" pains.    Patient wants a note to be cleared to go back to work in three days. Patient states that he feels ready to go back to work and can preform normal job duties. He works as a .   Denies previous hx of back injury or pain   Reports he has taken OTC ibuprofen with some pain relief  Denies neck pain   Denies incontinence of bowel or bladder.    Denies other associated aggravating or alleviating factors.     Review of Systems   Constitutional: Negative for chills and fever.   HENT: Negative for congestion and sore throat.    Eyes: Negative for blurred vision and double vision.   Respiratory: Negative for cough and shortness of breath.    Cardiovascular: Negative for chest pain.   Gastrointestinal: Negative for abdominal pain, nausea and vomiting.   Genitourinary: Negative for flank pain.   Musculoskeletal: Negative for myalgias.   Neurological: Positive for tingling and sensory change. Negative for dizziness, weakness and headaches.   Endo/Heme/Allergies: Negative for environmental allergies.       Past Medical History:   Diagnosis Date   • Foot injury     fracture when 21 yrs     Current Outpatient Medications on File Prior to Visit   Medication Sig Dispense Refill   • ibuprofen (MOTRIN) 200 MG Tab Take 200 mg by mouth every 6 hours as needed.     • vitamin D, Ergocalciferol, (DRISDOL) 78633 units Cap capsule Take 1 Cap by mouth every 7 days. " (Patient not taking: Reported on 2019) 12 Cap 0     No current facility-administered medications on file prior to visit.      Allergies   Allergen Reactions   • Robitussin Allergy-Cough [[id number]]    • Robitussin [Guiatuss]      Social History     Tobacco Use   • Smoking status: Former Smoker     Packs/day: 0.50     Years: 10.00     Pack years: 5.00     Types: Cigarettes     Last attempt to quit: 2018     Years since quittin.0   • Smokeless tobacco: Never Used   • Tobacco comment: 1/2 pack a day.   Substance Use Topics   • Alcohol use: Yes     Alcohol/week: 0.6 oz     Types: 1 Shots of liquor per week     Comment: occ      Objective:     /74   Pulse 89   Temp 36.7 °C (98 °F) (Temporal)   Resp 16   Wt (!) 129.7 kg (285 lb 15 oz)   SpO2 96%   BMI 37.72 kg/m²      Physical Exam   Constitutional: He is oriented to person, place, and time. He appears well-developed and well-nourished. No distress.   HENT:   Head: Normocephalic and atraumatic.   Eyes: Conjunctivae and EOM are normal.   Neck: Normal range of motion. Neck supple.   Cardiovascular: Normal rate, regular rhythm and normal heart sounds.   Pulmonary/Chest: Effort normal and breath sounds normal. No respiratory distress.   Abdominal: Soft.   Musculoskeletal: Normal range of motion.   Mild muscular tenderness over right paraspinous thoracic area and left paraspinous lumbar area.  No step-off or deformities noted.  No radiculopathy.  Strengths are 5 out of 5 bilateral upper and lower extremities.  Patient able to get on and off exam table without any difficulties.   Neurological: He is oriented to person, place, and time. No cranial nerve deficit or sensory deficit. He exhibits normal muscle tone.   Skin: Skin is warm and dry.   Psychiatric: He has a normal mood and affect. His behavior is normal. Judgment and thought content normal.   Vitals reviewed.              Assessment/Plan:     1. Back strain, initial encounter          Patient declines prescription for muscle relaxant.  Recommend continued use of OTC ibuprofen for pain relief.  Apply heat to affected area.  Avoid any heavy lifting over the next 2 days.  Patient cleared to go back to work on Monday, 9/9/2019.  Patient states that he will be able to perform all job duties without any difficulty.  PT should follow up with PCP in 1-2 days for re-evaluation if symptoms have not improved.  Discussed red flags and reasons to return to UC or ED.  Pt and/or family verbalized understanding of diagnosis and follow up instructions and was offered informational handout on diagnosis.  PT discharged.

## 2020-03-18 ENCOUNTER — OFFICE VISIT (OUTPATIENT)
Dept: URGENT CARE | Facility: PHYSICIAN GROUP | Age: 35
End: 2020-03-18
Payer: COMMERCIAL

## 2020-03-18 VITALS
DIASTOLIC BLOOD PRESSURE: 84 MMHG | TEMPERATURE: 98.5 F | HEART RATE: 76 BPM | BODY MASS INDEX: 38.26 KG/M2 | OXYGEN SATURATION: 97 % | RESPIRATION RATE: 16 BRPM | SYSTOLIC BLOOD PRESSURE: 120 MMHG | WEIGHT: 290 LBS

## 2020-03-18 DIAGNOSIS — R42 VERTIGO: ICD-10-CM

## 2020-03-18 DIAGNOSIS — R51.9 ACUTE NONINTRACTABLE HEADACHE, UNSPECIFIED HEADACHE TYPE: ICD-10-CM

## 2020-03-18 LAB — GLUCOSE BLD-MCNC: 93 MG/DL (ref 70–100)

## 2020-03-18 PROCEDURE — 99214 OFFICE O/P EST MOD 30 MIN: CPT | Performed by: PHYSICIAN ASSISTANT

## 2020-03-18 PROCEDURE — 82962 GLUCOSE BLOOD TEST: CPT | Performed by: PHYSICIAN ASSISTANT

## 2020-03-18 RX ORDER — MECLIZINE HYDROCHLORIDE 25 MG/1
25 TABLET ORAL 3 TIMES DAILY PRN
Qty: 30 TAB | Refills: 0 | Status: SHIPPED | OUTPATIENT
Start: 2020-03-18 | End: 2022-02-15

## 2020-03-18 ASSESSMENT — ENCOUNTER SYMPTOMS
EYE PAIN: 0
DIARRHEA: 0
FOCAL WEAKNESS: 0
MEMORY LOSS: 0
RESPIRATORY NEGATIVE: 1
SENSORY CHANGE: 0
RHINORRHEA: 0
CARDIOVASCULAR NEGATIVE: 1
NAUSEA: 0
SINUS PAIN: 0
PHOTOPHOBIA: 1
SORE THROAT: 0
BLURRED VISION: 0
HEADACHES: 1
TINGLING: 1
EYE REDNESS: 0
NECK PAIN: 0
SPEECH CHANGE: 0
LOSS OF BALANCE: 0
VISUAL CHANGE: 0
VOMITING: 0
ABDOMINAL PAIN: 0
MIGRAINE HEADACHES: 0
WEAKNESS: 0
PALPITATIONS: 0
FEVER: 0

## 2020-03-18 ASSESSMENT — FIBROSIS 4 INDEX: FIB4 SCORE: 0.61

## 2020-03-18 ASSESSMENT — PAIN SCALES - GENERAL: PAINLEVEL: 4=SLIGHT-MODERATE PAIN

## 2020-03-18 NOTE — PROGRESS NOTES
Subjective:      Zachary Massey is a 34 y.o. male who presents with Headache (off and for couple months with B hands tingling, and dizziness)            Positional dizziness and headaches for the last 2 months.    Headache    This is a new problem. The current episode started more than 1 month ago. The problem occurs daily. The problem has been waxing and waning. The pain is located in the bilateral region. The pain does not radiate. The pain quality is not similar to prior headaches. The quality of the pain is described as stabbing and throbbing. Associated symptoms include phonophobia, photophobia and tingling. Pertinent negatives include no abdominal pain, blurred vision, ear pain, eye pain, eye redness, fever, loss of balance, muscle aches, nausea, neck pain, rhinorrhea, sore throat, visual change, vomiting or weakness. He has tried NSAIDs for the symptoms. The treatment provided mild relief. There is no history of migraine headaches or migraines in the family.       PMH:  has a past medical history of Foot injury.  MEDS:   Current Outpatient Medications:   •  meclizine (ANTIVERT) 25 MG Tab, Take 1 Tab by mouth 3 times a day as needed., Disp: 30 Tab, Rfl: 0  •  ibuprofen (MOTRIN) 200 MG Tab, Take 200 mg by mouth every 6 hours as needed., Disp: , Rfl:   •  vitamin D, Ergocalciferol, (DRISDOL) 67328 units Cap capsule, Take 1 Cap by mouth every 7 days. (Patient not taking: Reported on 9/6/2019), Disp: 12 Cap, Rfl: 0  ALLERGIES:   Allergies   Allergen Reactions   • Robitussin Allergy-Cough [[id number]]    • Robitussin [Guiatuss]      SURGHX:   Past Surgical History:   Procedure Laterality Date   • OTHER  2003    donor for bone marrow transplant   • PB REMOVAL OF NAIL BED  right great toe.      SOCHX:  reports that he quit smoking about 19 months ago. His smoking use included cigarettes. He has a 5.00 pack-year smoking history. He has never used smokeless tobacco. He reports current alcohol use of about  0.6 oz of alcohol per week. He reports that he does not use drugs.  FH: family history includes Diabetes in his mother and paternal aunt; Heart Attack in his father; Hypertension in his father; Other in his brother.      Review of Systems   Constitutional: Negative for fever.   HENT: Negative.  Negative for congestion, ear pain, rhinorrhea, sinus pain and sore throat.    Eyes: Positive for photophobia. Negative for blurred vision, pain and redness.   Respiratory: Negative.    Cardiovascular: Negative.  Negative for chest pain, palpitations and leg swelling.   Gastrointestinal: Negative for abdominal pain, diarrhea, nausea and vomiting.   Musculoskeletal: Negative for joint pain and neck pain.   Neurological: Positive for tingling and headaches. Negative for sensory change, speech change, focal weakness, weakness and loss of balance.   Psychiatric/Behavioral: Negative for memory loss.       Medications, Allergies, and current problem list reviewed today in Epic     Objective:     /84   Pulse 76   Temp 36.9 °C (98.5 °F) (Temporal)   Resp 16   Wt (!) 131.5 kg (290 lb)   SpO2 97%   BMI 38.26 kg/m²      Physical Exam  Vitals signs and nursing note reviewed.   Constitutional:       General: He is not in acute distress.     Appearance: He is well-developed. He is not ill-appearing, toxic-appearing or diaphoretic.   HENT:      Head: Normocephalic and atraumatic.      Right Ear: Tympanic membrane and external ear normal. There is no impacted cerumen.      Left Ear: Tympanic membrane and external ear normal. There is no impacted cerumen.      Nose: Nose normal. No congestion or rhinorrhea.      Mouth/Throat:      Pharynx: No oropharyngeal exudate.   Eyes:      General:         Right eye: No discharge.         Left eye: No discharge.      Conjunctiva/sclera: Conjunctivae normal.      Pupils: Pupils are equal, round, and reactive to light.   Neck:      Musculoskeletal: Normal range of motion and neck supple.    Cardiovascular:      Rate and Rhythm: Normal rate and regular rhythm.      Heart sounds: Normal heart sounds. No murmur.   Pulmonary:      Effort: Pulmonary effort is normal. No respiratory distress.      Breath sounds: Normal breath sounds. No wheezing, rhonchi or rales.   Chest:      Chest wall: No tenderness.   Lymphadenopathy:      Cervical: No cervical adenopathy.   Skin:     General: Skin is warm and dry.   Neurological:      General: No focal deficit present.      Mental Status: He is alert and oriented to person, place, and time. Mental status is at baseline.      Cranial Nerves: No cranial nerve deficit.      Sensory: No sensory deficit.      Motor: Motor function is intact. No weakness.      Coordination: Coordination is intact. Romberg sign negative. Coordination normal.      Gait: Gait is intact. Gait and tandem walk normal.      Deep Tendon Reflexes: Reflexes normal.      Comments: Dizziness easily reproduced with rapid head movement and abrupt position change   Psychiatric:         Behavior: Behavior normal.         Thought Content: Thought content normal.         Judgment: Judgment normal.                 Assessment/Plan:     1. Acute nonintractable headache, unspecified headache type  POCT Glucose   2. Vertigo  meclizine (ANTIVERT) 25 MG Tab     Vitals normal, exam unremarkable.  Patient having positional dizziness and headache for the last few months.  Coordination and gait within normal limits.  No other contributory medical history.  Discussed potential testing however patient has appointment with PCP tomorrow.  He was instructed to use OTC meds at this time for headaches.  He will be given meclizine for positional dizziness.  He was given information handout and instructed to attempt the Epley maneuver.  If no improvement over the next several weeks patient will return to clinic because he will need imaging completed.  OTC meds and conservative measures as discussed    Return to clinic or go to  ED if symptoms worsen or persist. Indications for ED discussed at length. Patient voices understanding. Follow-up with your primary care provider in 3-5 days. Red flags discussed. All side effects of medication discussed including allergic response, GI upset, tendon injury, etc.    Please note that this dictation was created using voice recognition software. I have made every reasonable attempt to correct obvious errors, but I expect that there are errors of grammar and possibly content that I did not discover before finalizing the note.

## 2020-03-18 NOTE — LETTER
March 18, 2020         Patient: Zachary Massey   YOB: 1985   Date of Visit: 3/18/2020           To Whom it May Concern:    Zachary Massey was seen in my clinic on 3/18/2020. He may return to work on Monday March 23rd..    If you have any questions or concerns, please don't hesitate to call.        Sincerely,           Raymond Mchugh P.A.-C.  Electronically Signed

## 2020-03-18 NOTE — PATIENT INSTRUCTIONS
Benign Positional Vertigo  Introduction  Vertigo is the feeling that you or your surroundings are moving when they are not. Benign positional vertigo is the most common form of vertigo. The cause of this condition is not serious (is benign). This condition is triggered by certain movements and positions (is positional). This condition can be dangerous if it occurs while you are doing something that could endanger you or others, such as driving.  What are the causes?  In many cases, the cause of this condition is not known. It may be caused by a disturbance in an area of the inner ear that helps your brain to sense movement and balance. This disturbance can be caused by a viral infection (labyrinthitis), head injury, or repetitive motion.  What increases the risk?  This condition is more likely to develop in:  · Women.  · People who are 50 years of age or older.  What are the signs or symptoms?  Symptoms of this condition usually happen when you move your head or your eyes in different directions. Symptoms may start suddenly, and they usually last for less than a minute. Symptoms may include:  · Loss of balance and falling.  · Feeling like you are spinning or moving.  · Feeling like your surroundings are spinning or moving.  · Nausea and vomiting.  · Blurred vision.  · Dizziness.  · Involuntary eye movement (nystagmus).  Symptoms can be mild and cause only slight annoyance, or they can be severe and interfere with daily life. Episodes of benign positional vertigo may return (recur) over time, and they may be triggered by certain movements. Symptoms may improve over time.  How is this diagnosed?  This condition is usually diagnosed by medical history and a physical exam of the head, neck, and ears. You may be referred to a health care provider who specializes in ear, nose, and throat (ENT) problems (otolaryngologist) or a provider who specializes in disorders of the nervous system (neurologist). You may have  additional testing, including:  · MRI.  · A CT scan.  · Eye movement tests. Your health care provider may ask you to change positions quickly while he or she watches you for symptoms of benign positional vertigo, such as nystagmus. Eye movement may be tested with an electronystagmogram (ENG), caloric stimulation, the Renea-Hallpike test, or the roll test.  · An electroencephalogram (EEG). This records electrical activity in your brain.  · Hearing tests.  How is this treated?  Usually, your health care provider will treat this by moving your head in specific positions to adjust your inner ear back to normal. Surgery may be needed in severe cases, but this is rare. In some cases, benign positional vertigo may resolve on its own in 2-4 weeks.  Follow these instructions at home:  Safety  · Move slowly.Avoid sudden body or head movements.  · Avoid driving.  · Avoid operating heavy machinery.  · Avoid doing any tasks that would be dangerous to you or others if a vertigo episode would occur.  · If you have trouble walking or keeping your balance, try using a cane for stability. If you feel dizzy or unstable, sit down right away.  · Return to your normal activities as told by your health care provider. Ask your health care provider what activities are safe for you.  General instructions  · Take over-the-counter and prescription medicines only as told by your health care provider.  · Avoid certain positions or movements as told by your health care provider.  · Drink enough fluid to keep your urine clear or pale yellow.  · Keep all follow-up visits as told by your health care provider. This is important.  Contact a health care provider if:  · You have a fever.  · Your condition gets worse or you develop new symptoms.  · Your family or friends notice any behavioral changes.  · Your nausea or vomiting gets worse.  · You have numbness or a “pins and needles” sensation.  Get help right away if:  · You have difficulty speaking or  moving.  · You are always dizzy.  · You faint.  · You develop severe headaches.  · You have weakness in your legs or arms.  · You have changes in your hearing or vision.  · You develop a stiff neck.  · You develop sensitivity to light.  This information is not intended to replace advice given to you by your health care provider. Make sure you discuss any questions you have with your health care provider.  Document Released: 09/25/2007 Document Revised: 05/25/2017 Document Reviewed: 04/11/2016  © 2017 [employer]

## 2022-02-15 ENCOUNTER — TELEMEDICINE (OUTPATIENT)
Dept: MEDICAL GROUP | Facility: CLINIC | Age: 37
End: 2022-02-15
Payer: MEDICAID

## 2022-02-15 VITALS — HEIGHT: 73 IN | BODY MASS INDEX: 37.77 KG/M2 | WEIGHT: 285 LBS

## 2022-02-15 DIAGNOSIS — Z00.00 ROUTINE PHYSICAL EXAMINATION: ICD-10-CM

## 2022-02-15 DIAGNOSIS — E55.9 VITAMIN D DEFICIENCY: ICD-10-CM

## 2022-02-15 DIAGNOSIS — E66.09 CLASS 2 OBESITY DUE TO EXCESS CALORIES WITHOUT SERIOUS COMORBIDITY WITH BODY MASS INDEX (BMI) OF 37.0 TO 37.9 IN ADULT: ICD-10-CM

## 2022-02-15 DIAGNOSIS — R73.03 PREDIABETES: ICD-10-CM

## 2022-02-15 DIAGNOSIS — E78.5 DYSLIPIDEMIA: ICD-10-CM

## 2022-02-15 DIAGNOSIS — R06.83 LOUD SNORING: ICD-10-CM

## 2022-02-15 PROBLEM — R74.01 ELEVATED ALANINE AMINOTRANSFERASE (ALT) LEVEL: Status: RESOLVED | Noted: 2018-11-29 | Resolved: 2022-02-15

## 2022-02-15 PROCEDURE — 99214 OFFICE O/P EST MOD 30 MIN: CPT | Performed by: PHYSICIAN ASSISTANT

## 2022-02-15 ASSESSMENT — PATIENT HEALTH QUESTIONNAIRE - PHQ9: CLINICAL INTERPRETATION OF PHQ2 SCORE: 0

## 2022-02-15 NOTE — PROGRESS NOTES
Virtual Visit: New Patient   This visit was conducted via Zoom using secure and encrypted videoconferencing technology. The patient was in a private location in the state of [place].    The patient's identity was confirmed and verbal consent was obtained for this virtual visit.    Subjective:     CC:   Chief Complaint   Patient presents with   • [facility] Care     sleep apnea        Zachary Massey is a 36 y.o. 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  He was told that his neck size was too large and that he may have sleep apnea. He does snore very loud but has no witnessed apnea episodes. Denies excessive daytime sleepiness or un-refreshing sleep.  Was given the name of a specific clinic that he needs to go to for this testing. Order has been signed and faxed to the clinic as requested. The clinic will obtain all prior authorizations.    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  Order has been paced.    Prediabetes  Patient is due for routine fasting labs. He has not had labs done in over two years. [facility].      ROS  See HPI  Constitutional: Negative for fever, chills and malaise/fatigue.   HENT: Negative for congestion, bloody nose,ear pain, change in hearing or sore throat.    Eyes: Negative for pain, discharge, itching.   Respiratory: Negative for cough and shortness of breath.    Cardiovascular: Negative for leg swelling.   Gastrointestinal: Negative for nausea, vomiting, abdominal pain, constipation and diarrhea.   Genitourinary: Negative for dysuria and hematuria.   Skin: Negative for rash.    Neurological: Negative for dizziness, focal weakness and headaches.   Endo/Heme/Allergies: Does not bruise/bleed easily.   Psychiatric/Behavioral: Negative for depression.  The patient is not nervous/anxious.      Allergies   Allergen Reactions   • Robitussin Allergy-Cough [[id number]]    • Robitussin [Guiatuss]        Current medicines (including changes today)  No current outpatient medications on file.     No current facility-administered medications for this visit.       He  has a past medical history of Elevated alanine aminotransferase (ALT) level (11/29/2018) and Foot injury. He also has no past medical history of Addisons disease (HCC), Adrenal disorder (HCC), Allergy, Anemia, Anxiety, Arrhythmia, Arthritis, Asthma, Blood transfusion without reported diagnosis, Cancer (HCC), Cancer of peritoneum (HCC), CHF (congestive heart failure) (HCC), Clotting disorder (HCC), COPD (chronic obstructive pulmonary disease) (HCC), Depression, Diabetes (HCC), Diabetic neuropathy (HCC), GERD (gastroesophageal reflux disease), Glaucoma, Goiter, Head ache, Heart attack (HCC), Heart murmur, HIV (human immunodeficiency virus infection) (HCC), Hyperlipidemia, Hypertension, IBD (inflammatory bowel disease), Kidney disease, Malignant neoplasm of fallopian tube (HCC), Meningitis, Migraine, Muscle disorder, Osteoporosis, Ovarian cancer (HCC), Parathyroid disorder (HCC), Pituitary disease (HCC), Pulmonary emphysema (HCC), Seizure (HCC), Sickle cell disease (HCC), Stroke (HCC), Substance abuse (HCC), Thyroid disease, Tuberculosis, or Urinary tract infection.  He  has a past surgical history that includes pr removal of nail bed (right great toe. ); other (2003); dental extraction(s); and cyst excision (Left).      Family History   Problem Relation Age of Onset   • Diabetes Mother    • Heart Attack Father    • Hypertension Father    • Heart Disease Father    • Hyperlipidemia Father    • Cancer Sister    • Other Brother         " leukemia   • Cancer Brother         AML   • Diabetes Paternal Aunt      Family Status   Relation Name Status   • Mo  Alive   • Fa  Alive   • Sis  Alive   • Bro   at age 21   • PAunt  Alive       Patient Active Problem List    Diagnosis Date Noted   • Loud snoring 02/15/2022   • Nonspecific abnormal electrocardiogram (ECG) (EKG) 2019   • Prediabetes 10/31/2018   • Vitamin D deficiency 10/31/2018   • Left-sided chest wall pain 2018   • Class 2 obesity due to excess calories without serious comorbidity with body mass index (BMI) of 37.0 to 37.9 in adult 2018          Objective:   Ht 1.854 m (6' 1\") Comment: stated by pt  Wt (!) 129 kg (285 lb) Comment: stated by pt  BMI 37.60 kg/m²     Physical Exam:  Constitutional: Alert, no distress, well-groomed.  Skin: No rashes or lesions in visible areas.  Eye: Round. Conjunctiva clear, lids normal. No icterus.   ENMT: Lips pink without lesions, good dentition, moist mucous membranes. Phonation normal.  Neck: No masses, no thyromegaly. Moves freely without pain.  Respiratory: Unlabored respiratory effort, no cough or audible wheeze  Psych: Alert and oriented x3, normal affect and mood.       Assessment and Plan:   The following treatment plan was discussed:     1. Loud snoring    2. Class 2 obesity due to excess calories without serious comorbidity with body mass index (BMI) of 37.0 to 37.9 in adult    3. Prediabetes  - Comp Metabolic Panel; Future    4. Vitamin D deficiency  - VITAMIN D,25 HYDROXY; Future    5. Routine physical examination  - Comp Metabolic Panel; Future  - Lipid Profile; Future    6. Dyslipidemia  - Lipid Profile; Future        Follow-up: Return in about 1 year (around 2/15/2023).         "

## 2022-02-15 NOTE — ASSESSMENT & PLAN NOTE
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  He was told that his neck size was too large and that he may have sleep apnea. He does snore very loud but has no witnessed apnea episodes. Denies excessive daytime sleepiness or un-refreshing sleep.  Was given the name of a specific clinic that he needs to go to for this testing. Order has been signed and faxed to the clinic as requested. The clinic will obtain all prior authorizations.

## 2022-02-15 NOTE — ASSESSMENT & PLAN NOTE
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  Order has been paced.

## 2022-02-15 NOTE — ASSESSMENT & PLAN NOTE
Patient is due for routine fasting labs. He has not had labs done in over two years. [facility]

## 2023-10-16 ENCOUNTER — DOCUMENTATION (OUTPATIENT)
Dept: HEALTH INFORMATION MANAGEMENT | Facility: OTHER | Age: 38
End: 2023-10-16
Payer: MEDICAID

## 2024-05-18 ENCOUNTER — APPOINTMENT (OUTPATIENT)
Dept: URGENT CARE | Facility: PHYSICIAN GROUP | Age: 39
End: 2024-05-18

## 2024-05-18 ENCOUNTER — OFFICE VISIT (OUTPATIENT)
Dept: URGENT CARE | Facility: PHYSICIAN GROUP | Age: 39
End: 2024-05-18
Payer: MEDICAID

## 2024-05-18 VITALS
RESPIRATION RATE: 20 BRPM | TEMPERATURE: 98.5 F | SYSTOLIC BLOOD PRESSURE: 116 MMHG | OXYGEN SATURATION: 95 % | HEART RATE: 101 BPM | DIASTOLIC BLOOD PRESSURE: 78 MMHG | BODY MASS INDEX: 39.57 KG/M2 | HEIGHT: 73 IN | WEIGHT: 298.6 LBS

## 2024-05-18 DIAGNOSIS — L03.115 CELLULITIS OF RIGHT LOWER EXTREMITY: ICD-10-CM

## 2024-05-18 PROCEDURE — 99214 OFFICE O/P EST MOD 30 MIN: CPT | Performed by: NURSE PRACTITIONER

## 2024-05-18 PROCEDURE — 3078F DIAST BP <80 MM HG: CPT | Performed by: NURSE PRACTITIONER

## 2024-05-18 PROCEDURE — 3074F SYST BP LT 130 MM HG: CPT | Performed by: NURSE PRACTITIONER

## 2024-05-18 PROCEDURE — 1125F AMNT PAIN NOTED PAIN PRSNT: CPT | Performed by: NURSE PRACTITIONER

## 2024-05-18 RX ORDER — CEPHALEXIN 500 MG/1
500 CAPSULE ORAL 4 TIMES DAILY
Qty: 28 CAPSULE | Refills: 0 | Status: SHIPPED | OUTPATIENT
Start: 2024-05-18 | End: 2024-05-25

## 2024-05-18 ASSESSMENT — PAIN SCALES - GENERAL: PAINLEVEL: 3=SLIGHT PAIN

## 2024-05-18 NOTE — PROGRESS NOTES
Chief Complaint   Patient presents with    Leg Swelling     Per patient, swelling, pain, and rash of right lower leg began yesterday, Fri., [id number].    Fever    Headache    Dizziness       HISTORY OF PRESENT ILLNESS: Patient is a pleasant 38 y.o. male who presents to urgent care today with complaints of right lower leg pain, swelling, erythema.  Symptoms started last night with fever, chills, malaise.  Notes onset of erythema today.  Patient has a history of right lower extremity cellulitis in his past, this feels exactly similar.  Last treated over 2 years ago.  He has tried OTC medication for symptom relief.    Patient Active Problem List    Diagnosis Date Noted    Loud snoring 02/15/2022    Nonspecific abnormal electrocardiogram (ECG) (EKG) 01/09/2019    Prediabetes 10/31/2018    Vitamin D deficiency 10/31/2018    Left-sided chest wall pain 09/19/2018    Class 2 obesity due to excess calories without serious comorbidity with body mass index (BMI) of 37.0 to 37.9 in adult 05/27/2018       Allergies:Robitussin allergy-cough [[id number]] and Robitussin [guiatuss]    Current Outpatient Medications Ordered in Epic   Medication Sig Dispense Refill    cephALEXin (KEFLEX) 500 MG Cap Take 1 Capsule by mouth 4 times a day for 7 days. 28 Capsule 0     Current Facility-Administered Medications Ordered in Epic   Medication Dose Route Frequency Provider Last Rate Last Admin    cefTRIAXone (Rocephin) 500 mg in lidocaine (Xylocaine) 1 % 2 mL for IM use  500 mg Intramuscular Once         cefTRIAXone (Rocephin) 500 mg in lidocaine (Xylocaine) 1 % 2 mL for IM use  500 mg Intramuscular Once            Past Medical History:   Diagnosis Date    Elevated alanine aminotransferase (ALT) level 11/29/2018    Foot injury     fracture when 21 yrs       Social History     Tobacco Use    Smoking status: Former     Current packs/day: 0.00     Average packs/day: 0.5 packs/day for 15.0 years (7.5 ttl pk-yrs)     Types: Cigarettes      "Start date: 2003     Quit date: 2018     Years since quittin.8    Smokeless tobacco: Never    Tobacco comments:     1/2 pack a day.   Vaping Use    Vaping status: Some Days    Start date: 2021    Substances: Nicotine, Flavoring, Nicotine 3 mg    Devices: Refillable tank   Substance Use Topics    Alcohol use: Yes     Alcohol/week: 0.6 oz     Types: 1 Shots of liquor per week     Comment: occ    Drug use: No       Family Status   Relation Name Status    Mo  Alive    Fa  Alive    Sis      Bro   at age 21    PAunt  Alive    MGMo  Alive    MGFa      PGMo  Alive    PGFa  Alive     Family History   Problem Relation Age of Onset    Diabetes Mother     Heart Attack Father     Hypertension Father     Heart Disease Father     Hyperlipidemia Father     Cancer Sister     Other Brother         leukemia    Cancer Brother         AML    Diabetes Paternal Aunt        ROS:  Review of Systems   Constitutional: Positive for fever, chills, malaise, and fatigue.   HENT: Negative for ear pain, nosebleeds, congestion, sore throat and neck pain.    Eyes: Negative for vision changes.   Neuro: Negative for headache, sensory changes, weakness, seizure, LOC.   Cardiovascular: Negative for chest pain, palpitations, orthopnea and leg swelling.   Respiratory: Negative for cough, sputum production, shortness of breath and wheezing.   Gastrointestinal: Negative for abdominal pain, nausea, vomiting or diarrhea.   Genitourinary: Negative for dysuria, urgency and frequency.  Musculoskeletal: Positive for right lower extremity pain, swelling.  Negative for falls, neck pain, back pain, joint pain, myalgias.   Skin: Positive for erythema.  Negative for rash, diaphoresis.     Exam:  /78 (BP Location: Left arm, Patient Position: Sitting, BP Cuff Size: Large adult)   Pulse (!) 101   Temp 36.9 °C (98.5 °F) (Temporal)   Resp 20   Ht 1.854 m (6' 1\")   Wt (!) 135 kg (298 lb 9.6 oz)   SpO2 95%   General: " well-nourished, well-developed male in NAD  Head: normocephalic, atraumatic  Eyes: PERRLA, no conjunctival injection, acuity grossly intact, lids normal.  Ears: normal shape and symmetry, no tenderness, no discharge. External canals are without any significant edema or erythema. Tympanic membranes are without any inflammation, no effusion. Gross auditory acuity is intact.  Nose: symmetrical without tenderness, no discharge.  Mouth/Throat: reasonable hygiene, no erythema, exudates or tonsillar enlargement.  Neck: no masses, range of motion within normal limits, no tracheal deviation. No obvious thyroid enlargement.   Lymph: no cervical adenopathy. No supraclavicular adenopathy.   Neuro: alert and oriented. Cranial nerves 1-12 grossly intact. No sensory deficit.   Cardiovascular: regular rate auscultated 90, regular rhythm. No edema.  Pulmonary: no distress. Chest is symmetrical with respiration, no wheezes, crackles, or rhonchi.   Musculoskeletal: no clubbing, appropriate muscle tone, gait is stable.  Right lower extremity has diffuse erythema to shin and calf, without circumferential presentation.  Skin: warm, dry, intact, no clubbing, no cyanosis, no rashes.   Psych: appropriate mood, affect, judgement.         Assessment/Plan:  1. Cellulitis of right lower extremity  cefTRIAXone (Rocephin) 500 mg in lidocaine (Xylocaine) 1 % 2 mL for IM use    cefTRIAXone (Rocephin) 500 mg in lidocaine (Xylocaine) 1 % 2 mL for IM use    cephALEXin (KEFLEX) 500 MG Cap          Patient presents with right lower extremity cellulitis.  Rocephin provided in clinic, patient tolerated well.  Keflex as directed.  OTC Motrin for symptom relief.  Strict return to clinic and ER precautions advised.  Supportive care, differential diagnoses, and indications for immediate follow-up discussed with patient.   Pathogenesis of diagnosis discussed including typical length and natural progression.   Instructed to return to clinic or nearest emergency  department for any change in condition, further concerns, or worsening of symptoms.  Patient states understanding of the plan of care and discharge instructions.        Please note that this dictation was created using voice recognition software. I have made every reasonable attempt to correct obvious errors, but I expect that there are errors of grammar and possibly content that I did not discover before finalizing the note.      SARAH Diego.